# Patient Record
Sex: FEMALE | Race: WHITE | NOT HISPANIC OR LATINO | Employment: FULL TIME | URBAN - METROPOLITAN AREA
[De-identification: names, ages, dates, MRNs, and addresses within clinical notes are randomized per-mention and may not be internally consistent; named-entity substitution may affect disease eponyms.]

---

## 2022-10-06 ENCOUNTER — OCCUPATIONAL HEALTH (OUTPATIENT)
Dept: URGENT CARE | Facility: CLINIC | Age: 42
End: 2022-10-06

## 2022-10-06 DIAGNOSIS — Z00.00 ENCOUNTER FOR PHYSICAL EXAMINATION: Primary | ICD-10-CM

## 2022-10-06 PROCEDURE — 99499 UNLISTED E&M SERVICE: CPT | Mod: S$GLB,,, | Performed by: NURSE PRACTITIONER

## 2022-10-06 PROCEDURE — 99499 PHYSICAL, BASIC COMPLEXITY: ICD-10-PCS | Mod: S$GLB,,, | Performed by: NURSE PRACTITIONER

## 2023-02-01 ENCOUNTER — TELEPHONE (OUTPATIENT)
Dept: FAMILY MEDICINE | Facility: CLINIC | Age: 43
End: 2023-02-01
Payer: OTHER GOVERNMENT

## 2023-02-01 ENCOUNTER — PATIENT OUTREACH (OUTPATIENT)
Dept: ADMINISTRATIVE | Facility: HOSPITAL | Age: 43
End: 2023-02-01
Payer: OTHER GOVERNMENT

## 2023-02-01 DIAGNOSIS — Z12.31 OTHER SCREENING MAMMOGRAM: ICD-10-CM

## 2023-02-01 NOTE — TELEPHONE ENCOUNTER
----- Message from Ketty Pratt sent at 2/1/2023 10:23 AM CST -----  Regarding: Patient call back  Who called:ROBYN SAINZ [[id number]]    What is the request in detail: Patient is requesting a call back. She states she would like the staff to request her records from her [facility] Dr. Raulito Hampton ph. [phone]  Please advise.    Can the clinic reply by [facility]? No    Best call back number: [phone]    Additional Information: N/A

## 2023-02-13 ENCOUNTER — PATIENT MESSAGE (OUTPATIENT)
Dept: ADMINISTRATIVE | Facility: HOSPITAL | Age: 43
End: 2023-02-13
Payer: OTHER GOVERNMENT

## 2023-02-15 ENCOUNTER — PATIENT MESSAGE (OUTPATIENT)
Dept: FAMILY MEDICINE | Facility: CLINIC | Age: 43
End: 2023-02-15
Payer: OTHER GOVERNMENT

## 2023-02-15 ENCOUNTER — OFFICE VISIT (OUTPATIENT)
Dept: FAMILY MEDICINE | Facility: CLINIC | Age: 43
End: 2023-02-15
Payer: OTHER GOVERNMENT

## 2023-02-15 VITALS
OXYGEN SATURATION: 98 % | HEIGHT: 68 IN | BODY MASS INDEX: 27.13 KG/M2 | SYSTOLIC BLOOD PRESSURE: 118 MMHG | DIASTOLIC BLOOD PRESSURE: 75 MMHG | HEART RATE: 87 BPM | WEIGHT: 179 LBS

## 2023-02-15 DIAGNOSIS — I83.811 VARICOSE VEINS OF RIGHT LOWER EXTREMITY WITH PAIN: ICD-10-CM

## 2023-02-15 DIAGNOSIS — N92.6 IRREGULAR PERIODS: ICD-10-CM

## 2023-02-15 DIAGNOSIS — K21.00 GASTROESOPHAGEAL REFLUX DISEASE WITH ESOPHAGITIS WITHOUT HEMORRHAGE: ICD-10-CM

## 2023-02-15 DIAGNOSIS — G43.909 MIGRAINE WITHOUT STATUS MIGRAINOSUS, NOT INTRACTABLE, UNSPECIFIED MIGRAINE TYPE: ICD-10-CM

## 2023-02-15 DIAGNOSIS — Z00.00 ROUTINE HEALTH MAINTENANCE: Primary | ICD-10-CM

## 2023-02-15 DIAGNOSIS — M79.641 PAIN OF RIGHT HAND: ICD-10-CM

## 2023-02-15 DIAGNOSIS — K21.9 GASTROESOPHAGEAL REFLUX DISEASE WITHOUT ESOPHAGITIS: ICD-10-CM

## 2023-02-15 PROCEDURE — 99214 OFFICE O/P EST MOD 30 MIN: CPT | Mod: PBBFAC,PO | Performed by: FAMILY MEDICINE

## 2023-02-15 PROCEDURE — 99386 PREV VISIT NEW AGE 40-64: CPT | Mod: S$PBB,,, | Performed by: FAMILY MEDICINE

## 2023-02-15 PROCEDURE — 99999 PR PBB SHADOW E&M-EST. PATIENT-LVL IV: CPT | Mod: PBBFAC,,, | Performed by: FAMILY MEDICINE

## 2023-02-15 PROCEDURE — 99386 PR PREVENTIVE VISIT,NEW,40-64: ICD-10-PCS | Mod: S$PBB,,, | Performed by: FAMILY MEDICINE

## 2023-02-15 PROCEDURE — 99999 PR PBB SHADOW E&M-EST. PATIENT-LVL IV: ICD-10-PCS | Mod: PBBFAC,,, | Performed by: FAMILY MEDICINE

## 2023-02-15 RX ORDER — BUTALBITAL, ACETAMINOPHEN AND CAFFEINE 50; 325; 40 MG/1; MG/1; MG/1
1 TABLET ORAL EVERY 4 HOURS PRN
Qty: 90 TABLET | Refills: 0 | Status: SHIPPED | OUTPATIENT
Start: 2023-02-15

## 2023-02-15 RX ORDER — ESOMEPRAZOLE MAGNESIUM 40 MG/1
40 CAPSULE, DELAYED RELEASE ORAL
Qty: 30 CAPSULE | Refills: 11 | Status: SHIPPED | OUTPATIENT
Start: 2023-02-15 | End: 2023-02-16

## 2023-02-15 RX ORDER — ONDANSETRON 4 MG/1
TABLET, ORALLY DISINTEGRATING ORAL
COMMUNITY
Start: 2022-05-14 | End: 2023-02-15 | Stop reason: SDUPTHER

## 2023-02-15 RX ORDER — ONDANSETRON 4 MG/1
4 TABLET, ORALLY DISINTEGRATING ORAL EVERY 8 HOURS PRN
Qty: 40 TABLET | Refills: 1 | Status: SHIPPED | OUTPATIENT
Start: 2023-02-15 | End: 2023-10-23

## 2023-02-15 RX ORDER — CYCLOBENZAPRINE HCL 10 MG
TABLET ORAL
COMMUNITY
Start: 2022-07-15

## 2023-02-15 NOTE — PROGRESS NOTES
Subjective:       Patient ID: Alysa Jones is a 42 y.o. female.    Chief Complaint: Establish Care and Referral    HPI  Came in today to establish care. Requesting some different referrals to get set up since she recently moved to area.     2 months of right hand pain by 3rd MCP. Decreased  strength.       Last had EGD about 4 years ago. Has symptoms of GERD despite being on daily PPI.     Tests to Keep You Healthy    Mammogram: SCHEDULED  Cervical Cancer Screening: DUE      Social History     Social History Narrative    She does not exercise regularly.       Family History   Problem Relation Age of Onset    Hyperlipidemia Mother     Hypertension Mother     Cancer Maternal Aunt     Diabetes Maternal Grandfather     Heart disease Maternal Grandfather     Cancer Paternal Grandmother        Current Outpatient Medications:     cyclobenzaprine (FLEXERIL) 10 MG tablet, , Disp: , Rfl:     doxycycline (VIBRA-TABS) 100 MG tablet, Take 1 tablet (100 mg total) by mouth once daily., Disp: 30 tablet, Rfl: 0    ibuprofen (ADVIL,MOTRIN) 200 MG tablet, Take 200 mg by mouth every 6 (six) hours as needed.  , Disp: , Rfl:     butalbital-acetaminophen-caffeine -40 mg (FIORICET) -40 mg per tablet, Take 1 tablet by mouth every 4 (four) hours as needed for Pain., Disp: 90 tablet, Rfl: 0    ondansetron (ZOFRAN-ODT) 4 MG TbDL, Take 1 tablet (4 mg total) by mouth every 8 (eight) hours as needed., Disp: 40 tablet, Rfl: 1    pantoprazole (PROTONIX) 40 MG tablet, Take 1 tablet (40 mg total) by mouth once daily., Disp: 90 tablet, Rfl: 1    Current Facility-Administered Medications:     polidocanol 1 % (20 mg/2 mL) Soln 2 mL, 2 mL, Intravenous, Q30 Days, Markos Boone MD, 2 mL at 08/25/15 1642    Review of Systems   Constitutional:  Negative for chills and fever.   Respiratory:  Negative for cough and shortness of breath.    Cardiovascular:  Negative for chest pain.   Gastrointestinal:  Negative for abdominal  "pain.   Musculoskeletal:  Positive for arthralgias.   Skin:  Negative for rash.   Neurological:  Negative for dizziness.     Objective:   /75 (BP Location: Right arm, Patient Position: Sitting, BP Method: Small (Automatic))   Pulse 87   Ht 5' 8" (1.727 m)   Wt 81.2 kg (179 lb)   SpO2 98%   BMI 27.22 kg/m²      Physical Exam  Constitutional:       General: She is not in acute distress.     Appearance: She is well-developed. She is not diaphoretic.   HENT:      Head: Normocephalic and atraumatic.      Nose: Nose normal.   Eyes:      General:         Right eye: No discharge.         Left eye: No discharge.      Conjunctiva/sclera: Conjunctivae normal.      Pupils: Pupils are equal, round, and reactive to light.   Neck:      Thyroid: No thyromegaly.   Cardiovascular:      Rate and Rhythm: Normal rate and regular rhythm.      Heart sounds: No murmur heard.  Pulmonary:      Effort: Pulmonary effort is normal. No respiratory distress.      Breath sounds: Normal breath sounds.   Abdominal:      General: There is no distension.      Palpations: Abdomen is soft.   Skin:     Findings: No rash.   Neurological:      Mental Status: She is alert and oriented to person, place, and time.   Psychiatric:         Behavior: Behavior normal.       Assessment & Plan     Problem List Items Addressed This Visit          Neuro    Migraine    Relevant Medications    butalbital-acetaminophen-caffeine -40 mg (FIORICET) -40 mg per tablet       GI    GERD (gastroesophageal reflux disease)    Relevant Medications    ondansetron (ZOFRAN-ODT) 4 MG TbDL    Other Relevant Orders    Ambulatory referral/consult to Gastroenterology     Other Visit Diagnoses       Routine health maintenance    -  Primary    Relevant Orders    FOLLICLE STIMULATING HORMONE (Completed)    LUTEINIZING HORMONE (Completed)    Hepatitis C Antibody (Completed)    HIV 1/2 Ag/Ab (4th Gen) (Completed)    Lipid Panel (Completed)    Comprehensive Metabolic Panel " (Completed)    CBC Auto Differential (Completed)    TSH (Completed)    Hemoglobin A1C    Ambulatory referral/consult to Dermatology    Irregular periods        Relevant Orders    Ambulatory referral/consult to Obstetrics / Gynecology    Varicose veins of right lower extremity with pain        Relevant Orders    Ambulatory referral/consult to Vascular Surgery    Pain of right hand        Relevant Orders    Ambulatory referral/consult to Hand Surgery    X-Ray Hand Complete Right              Immunizations Administered on Date of Encounter - 2/15/2023       No immunizations on file.             No follow-ups on file.      Disclaimer:  This note may have been prepared using voice recognition software, it may have not been extensively proofed, as such there could be errors within the text such as sound alike errors.

## 2023-02-16 ENCOUNTER — PATIENT MESSAGE (OUTPATIENT)
Dept: DERMATOLOGY | Facility: CLINIC | Age: 43
End: 2023-02-16
Payer: OTHER GOVERNMENT

## 2023-02-16 ENCOUNTER — TELEPHONE (OUTPATIENT)
Dept: FAMILY MEDICINE | Facility: CLINIC | Age: 43
End: 2023-02-16
Payer: OTHER GOVERNMENT

## 2023-02-16 ENCOUNTER — TELEPHONE (OUTPATIENT)
Dept: GASTROENTEROLOGY | Facility: CLINIC | Age: 43
End: 2023-02-16
Payer: OTHER GOVERNMENT

## 2023-02-16 RX ORDER — PANTOPRAZOLE SODIUM 40 MG/1
40 TABLET, DELAYED RELEASE ORAL DAILY
Qty: 90 TABLET | Refills: 1 | Status: SHIPPED | OUTPATIENT
Start: 2023-02-16 | End: 2024-02-16

## 2023-02-16 NOTE — TELEPHONE ENCOUNTER
----- Message from Ketty Pratt sent at 2/16/2023  8:21 AM CST -----  Regarding: Patient call back  Who called: ROBYN SAINZ [[id number]]    What is the request in detail: Patient is requesting a call back. She states she has some dates available.   Please advise.    Can the clinic reply by [facility]? No    Best call back number: [phone]    Additional Information: N/A

## 2023-02-22 ENCOUNTER — PATIENT MESSAGE (OUTPATIENT)
Dept: FAMILY MEDICINE | Facility: CLINIC | Age: 43
End: 2023-02-22
Payer: OTHER GOVERNMENT

## 2023-02-23 ENCOUNTER — PATIENT MESSAGE (OUTPATIENT)
Dept: FAMILY MEDICINE | Facility: CLINIC | Age: 43
End: 2023-02-23
Payer: OTHER GOVERNMENT

## 2023-02-23 ENCOUNTER — HOSPITAL ENCOUNTER (OUTPATIENT)
Dept: RADIOLOGY | Facility: HOSPITAL | Age: 43
Discharge: HOME | End: 2023-02-23
Attending: FAMILY MEDICINE
Payer: OTHER GOVERNMENT

## 2023-02-23 DIAGNOSIS — M79.641 PAIN OF RIGHT HAND: ICD-10-CM

## 2023-02-23 PROCEDURE — 73130 X-RAY EXAM OF HAND: CPT | Mod: TC,RT

## 2023-02-23 PROCEDURE — 73130 XR HAND COMPLETE 3 VIEW RIGHT: ICD-10-PCS | Mod: 26,RT,, | Performed by: RADIOLOGY

## 2023-02-23 PROCEDURE — 73130 X-RAY EXAM OF HAND: CPT | Mod: 26,RT,, | Performed by: RADIOLOGY

## 2023-02-24 ENCOUNTER — PATIENT MESSAGE (OUTPATIENT)
Dept: FAMILY MEDICINE | Facility: CLINIC | Age: 43
End: 2023-02-24
Payer: OTHER GOVERNMENT

## 2023-02-28 ENCOUNTER — APPOINTMENT (OUTPATIENT)
Dept: RADIOLOGY | Facility: OTHER | Age: 43
End: 2023-02-28
Attending: FAMILY MEDICINE
Payer: OTHER GOVERNMENT

## 2023-02-28 VITALS — WEIGHT: 179 LBS | BODY MASS INDEX: 27.13 KG/M2 | HEIGHT: 68 IN

## 2023-02-28 DIAGNOSIS — Z12.31 OTHER SCREENING MAMMOGRAM: ICD-10-CM

## 2023-02-28 PROCEDURE — 77067 MAMMO DIGITAL SCREENING BILAT WITH TOMO: ICD-10-PCS | Mod: 26,,, | Performed by: RADIOLOGY

## 2023-02-28 PROCEDURE — 77063 MAMMO DIGITAL SCREENING BILAT WITH TOMO: ICD-10-PCS | Mod: 26,,, | Performed by: RADIOLOGY

## 2023-02-28 PROCEDURE — 77067 SCR MAMMO BI INCL CAD: CPT | Mod: 26,,, | Performed by: RADIOLOGY

## 2023-02-28 PROCEDURE — 77067 SCR MAMMO BI INCL CAD: CPT | Mod: TC,PN

## 2023-02-28 PROCEDURE — 77063 BREAST TOMOSYNTHESIS BI: CPT | Mod: 26,,, | Performed by: RADIOLOGY

## 2023-03-13 ENCOUNTER — PATIENT MESSAGE (OUTPATIENT)
Dept: FAMILY MEDICINE | Facility: CLINIC | Age: 43
End: 2023-03-13
Payer: OTHER GOVERNMENT

## 2023-03-13 DIAGNOSIS — D22.9 MULTIPLE ATYPICAL SKIN MOLES: Primary | ICD-10-CM

## 2023-03-15 ENCOUNTER — PATIENT MESSAGE (OUTPATIENT)
Dept: FAMILY MEDICINE | Facility: CLINIC | Age: 43
End: 2023-03-15
Payer: OTHER GOVERNMENT

## 2023-03-28 ENCOUNTER — PATIENT MESSAGE (OUTPATIENT)
Dept: FAMILY MEDICINE | Facility: CLINIC | Age: 43
End: 2023-03-28
Payer: OTHER GOVERNMENT

## 2023-04-17 ENCOUNTER — OFFICE VISIT (OUTPATIENT)
Dept: VASCULAR SURGERY | Facility: CLINIC | Age: 43
End: 2023-04-17
Payer: OTHER GOVERNMENT

## 2023-04-17 VITALS
SYSTOLIC BLOOD PRESSURE: 120 MMHG | DIASTOLIC BLOOD PRESSURE: 79 MMHG | HEART RATE: 77 BPM | BODY MASS INDEX: 27.42 KG/M2 | WEIGHT: 180.31 LBS

## 2023-04-17 DIAGNOSIS — I83.811 VARICOSE VEINS OF RIGHT LOWER EXTREMITY WITH PAIN: ICD-10-CM

## 2023-04-17 DIAGNOSIS — I83.811 VARICOSE VEINS OF RIGHT LOWER EXTREMITY WITH PAIN: Primary | ICD-10-CM

## 2023-04-17 PROCEDURE — 99999 PR PBB SHADOW E&M-EST. PATIENT-LVL III: ICD-10-PCS | Mod: PBBFAC,,, | Performed by: NURSE PRACTITIONER

## 2023-04-17 PROCEDURE — 99205 OFFICE O/P NEW HI 60 MIN: CPT | Mod: S$PBB,,, | Performed by: NURSE PRACTITIONER

## 2023-04-17 PROCEDURE — 99213 OFFICE O/P EST LOW 20 MIN: CPT | Mod: PBBFAC | Performed by: NURSE PRACTITIONER

## 2023-04-17 PROCEDURE — 99205 PR OFFICE/OUTPT VISIT, NEW, LEVL V, 60-74 MIN: ICD-10-PCS | Mod: S$PBB,,, | Performed by: NURSE PRACTITIONER

## 2023-04-17 PROCEDURE — 99999 PR PBB SHADOW E&M-EST. PATIENT-LVL III: CPT | Mod: PBBFAC,,, | Performed by: NURSE PRACTITIONER

## 2023-04-17 NOTE — PROGRESS NOTES
Lg Hendricks NP         [facility]               2023    HPI:  Alysa Jones is a 42 y.o. female with   Patient Active Problem List   Diagnosis    Tobacco use disorder    Incidental pulmonary nodule    Low back pain    History of scoliosis    GERD (gastroesophageal reflux disease)    Constipation    Migraine    Chest pain on breathing    Chest wall pain    Venous insufficiency    being managed by PCP and specialists who is here today for evaluation of  RLE edema, pain and varicose / spider veins.  Patient states location is RLE posterior knee.  Associated signs and symptoms include varicose and spider veins.  Quality is burning and bulging and severity is 5/10. Symptoms began several months ago.  Alleviating factors elevation.  Worsening factors dependency. Symptoms do not limit patient's functional status and daily activities.  Previous RLE EVLT () and multiple rounds of sclerotherapy (2376-3711). Patient subsequently developed a nonocclusive clot in her right leg post EVLT. + low sodium diet.  - excessive water intake. + compression stockings for 1 month.    No MI  No Stroke  Tobacco use: No    Past Medical History:   Diagnosis Date    Abnormal Pap smear     moderate dysplasia    Deep vein thrombosis     GERD (gastroesophageal reflux disease)     HPV (human papilloma virus) infection     Iliac DVT (deep venous thrombosis)     right, leg    Incidental pulmonary nodule     Migraine     Scoliosis     Tobacco use disorder     Varicose veins with inflammation     Venous reflux      Past Surgical History:   Procedure Laterality Date     SECTION      2    left fibula ORIF      VARICOSE VEIN SURGERY       Family History   Problem Relation Age of Onset    Hyperlipidemia Mother     Hypertension Mother     Breast cancer Maternal Aunt 40    Cancer Maternal Aunt     Diabetes Maternal Grandfather     Heart disease Maternal Grandfather     Breast cancer Paternal  Grandmother 40    Cancer Paternal Grandmother      Social History     Socioeconomic History    Marital status:      Spouse name: Onel    Number of children: 2   Occupational History    Occupation: radiology tech     Employer: [employer]   Tobacco Use    Smoking status: Former     Packs/day: 0.25     Types: Cigarettes    Smokeless tobacco: Never    Tobacco comments:     Pt started smoking again.    Substance and Sexual Activity    Alcohol use: Yes     Comment: occasional    Drug use: No    Sexual activity: Yes     Partners: Male   Social History Narrative    She does not exercise regularly.       Current Outpatient Medications:     butalbital-acetaminophen-caffeine -40 mg (FIORICET) -40 mg per tablet, Take 1 tablet by mouth every 4 (four) hours as needed for Pain., Disp: 90 tablet, Rfl: 0    cyclobenzaprine (FLEXERIL) 10 MG tablet, , Disp: , Rfl:     ibuprofen (ADVIL,MOTRIN) 200 MG tablet, Take 200 mg by mouth every 6 (six) hours as needed.  , Disp: , Rfl:     ondansetron (ZOFRAN-ODT) 4 MG TbDL, Take 1 tablet (4 mg total) by mouth every 8 (eight) hours as needed., Disp: 40 tablet, Rfl: 1    pantoprazole (PROTONIX) 40 MG tablet, Take 1 tablet (40 mg total) by mouth once daily., Disp: 90 tablet, Rfl: 1    doxycycline (VIBRA-TABS) 100 MG tablet, Take 1 tablet (100 mg total) by mouth once daily., Disp: 30 tablet, Rfl: 0    Current Facility-Administered Medications:     polidocanol 1 % (20 mg/2 mL) Soln 2 mL, 2 mL, Intravenous, Q30 Days, Markos Boone MD, 2 mL at 08/25/15 1642    REVIEW OF SYSTEMS:  General: No fevers or chills; ENT: No sore throat; Allergy and Immunology: no persistent infections; Hematological and Lymphatic: No history of bleeding or easy bruising; Endocrine: negative; Respiratory: no cough, shortness of breath, or wheezing; Cardiovascular: no chest pain or dyspnea on exertion; Gastrointestinal: no abdominal pain/back, change in bowel habits, or bloody  stools; Genito-Urinary: no dysuria, trouble voiding, or hematuria; Musculoskeletal: burning and aching pain; Neurological: no TIA or stroke symptoms; Psychiatric: no nervousness, anxiety or depression.    PHYSICAL EXAM:      Pulse: 77         General appearance:  Alert, well-appearing, and in no distress.  Oriented to person, place, and time                    Neurological: Normal speech, no focal findings noted; CN II - XII grossly intact. RLE with  sensation to light touch, LLE with  sensation to light touch.            Musculoskeletal: Digits/nail without cyanosis/clubbing.  Strength 5/5.                    Neck: Supple, no significant adenopathy                  Chest:  Clear to auscultation, no wheezes, rales or rhonchi, symmetric air entry. No use of accessory muscles               Cardiac: Normal rate and regular rhythm, S1 and S2 normal            Abdomen: Soft, nontender, nondistended, no masses or organomegaly, no hernia     No rebound tenderness noted; bowel sounds normal      Extremities:   2+ R femoral pulse, 2+ L femoral pulse     2+ R popliteal pulse, 2+ L popliteal pulse     2+ R PT pulse, 2+ L PT pulse     2+ R DP pulse, 2+ L DP pulse     1+ RLE edema,  absent LLE edema    Skin: RLE +varicose/spider veins; LLE +spider veins    LAB RESULTS:  No results found for: CBC  Lab Results   Component Value Date    LABPROT 11.1 06/29/2012    INR 1.1 06/29/2012     Lab Results   Component Value Date     02/23/2023    K 4.3 02/23/2023     02/23/2023    CO2 23 02/23/2023     02/23/2023    BUN 16 02/23/2023    CREATININE 0.8 02/23/2023    CALCIUM 9.8 02/23/2023    ANIONGAP 9 02/23/2023    EGFRNONAA >60.0 09/19/2014     Lab Results   Component Value Date    WBC 7.78 02/23/2023    RBC 4.28 02/23/2023    HGB 12.5 02/23/2023    HCT 39.4 02/23/2023    MCV 92 02/23/2023    MCH 29.2 02/23/2023    MCHC 31.7 (L) 02/23/2023    RDW 12.9 02/23/2023     02/23/2023    MPV 12.5 02/23/2023    GRAN 5.2  02/23/2023    GRAN 67.3 02/23/2023    LYMPH 1.9 02/23/2023    LYMPH 23.9 02/23/2023    MONO 0.5 02/23/2023    MONO 6.4 02/23/2023    EOS 0.1 02/23/2023    BASO 0.02 02/23/2023    EOSINOPHIL 1.8 02/23/2023    BASOPHIL 0.3 02/23/2023    DIFFMETHOD Automated 02/23/2023     .  Lab Results   Component Value Date    HGBA1C 5.1 02/23/2023       IMAGING:  All pertinent imaging has been reviewed and interpreted independently.  Venous Insuff US: PENDING    IMP/PLAN:  42 y.o. female with   Patient Active Problem List   Diagnosis    Tobacco use disorder    Incidental pulmonary nodule    Low back pain    History of scoliosis    GERD (gastroesophageal reflux disease)    Constipation    Migraine    Chest pain on breathing    Chest wall pain    Venous insufficiency    being managed by PCP and specialists who is here today for evaluation of RLE edema, pain and varicose / spider veins.    -recommend compression with Rx stockings, elevation, dietary changes associated with water and sodium intake discussed at length with patient  -Exercise   -Recommend warm compresses, NSAID and elevation for thrombophlebitis prn  -Venous insuff US  -RTC 1-2 months for further evaluation    I spent 30 minutes evaluating this patient and greater than 50% of the time was spent counseling, coordinator care and discussing the plan of care.  All questions were answered and patient stated understanding with agreement with the above treatment plan.    Lg Hendricks NP  Vascular and Endovascular Surgery

## 2023-04-28 ENCOUNTER — TELEPHONE (OUTPATIENT)
Dept: OBSTETRICS AND GYNECOLOGY | Facility: CLINIC | Age: 43
End: 2023-04-28
Payer: OTHER GOVERNMENT

## 2023-05-01 ENCOUNTER — HOSPITAL ENCOUNTER (OUTPATIENT)
Dept: CARDIOLOGY | Facility: HOSPITAL | Age: 43
Discharge: HOME | End: 2023-05-01
Attending: NURSE PRACTITIONER
Payer: OTHER GOVERNMENT

## 2023-05-01 ENCOUNTER — PATIENT MESSAGE (OUTPATIENT)
Dept: INTERNAL MEDICINE | Facility: CLINIC | Age: 43
End: 2023-05-01
Payer: OTHER GOVERNMENT

## 2023-05-01 DIAGNOSIS — I83.811 VARICOSE VEINS OF RIGHT LOWER EXTREMITY WITH PAIN: ICD-10-CM

## 2023-05-01 LAB
LEFT GREAT SAPHENOUS DISTAL THIGH DIA: 0.24 CM
LEFT GREAT SAPHENOUS JUNCTION DIA: 0.64 CM
LEFT GREAT SAPHENOUS KNEE DIA: 0.25 CM
LEFT GREAT SAPHENOUS MIDDLE THIGH DIA: 0.39 CM
LEFT GREAT SAPHENOUS PROXIMAL CALF DIA: 0.21 CM
LEFT SMALL SAPHENOUS KNEE DIA: 0.16 CM
LEFT SMALL SAPHENOUS SPJ DIA: 0.18 CM
RIGHT GREAT SAPHENOUS JUNCTION DIA: 0.44 CM
RIGHT GREAT SAPHENOUS KNEE DIA: 0.19 CM
RIGHT GREAT SAPHENOUS KNEE REFLUX: 4133 MS
RIGHT GREAT SAPHENOUS PROXIMAL CALF DIA: 0.13 CM
RIGHT SMALL SAPHENOUS KNEE DIA: 0.1 CM
RIGHT SMALL SAPHENOUS SPJ DIA: 0.17 CM

## 2023-05-01 PROCEDURE — 93970 EXTREMITY STUDY: CPT | Mod: 26,,, | Performed by: INTERNAL MEDICINE

## 2023-05-01 PROCEDURE — 93970 EXTREMITY STUDY: CPT | Mod: TC

## 2023-05-01 PROCEDURE — 93970 CV US LOWER VENOUS INSUFFICIENCY BILATERAL (CUPID ONLY): ICD-10-PCS | Mod: 26,,, | Performed by: INTERNAL MEDICINE

## 2023-05-06 ENCOUNTER — TELEPHONE (OUTPATIENT)
Dept: ORTHOPEDICS | Facility: CLINIC | Age: 43
End: 2023-05-06
Payer: OTHER GOVERNMENT

## 2023-06-01 ENCOUNTER — PATIENT MESSAGE (OUTPATIENT)
Dept: VASCULAR SURGERY | Facility: CLINIC | Age: 43
End: 2023-06-01
Payer: OTHER GOVERNMENT

## 2023-06-12 ENCOUNTER — OFFICE VISIT (OUTPATIENT)
Dept: DERMATOLOGY | Facility: CLINIC | Age: 43
End: 2023-06-12
Payer: OTHER GOVERNMENT

## 2023-06-12 DIAGNOSIS — L70.0 ACNE VULGARIS: ICD-10-CM

## 2023-06-12 DIAGNOSIS — D22.9 NEVUS: ICD-10-CM

## 2023-06-12 DIAGNOSIS — D23.30 FIBROUS PAPULE OF FACE: ICD-10-CM

## 2023-06-12 DIAGNOSIS — L82.1 SK (SEBORRHEIC KERATOSIS): ICD-10-CM

## 2023-06-12 DIAGNOSIS — L71.9 ROSACEA: Primary | ICD-10-CM

## 2023-06-12 DIAGNOSIS — D18.01 CHERRY ANGIOMA: ICD-10-CM

## 2023-06-12 PROCEDURE — 99204 OFFICE O/P NEW MOD 45 MIN: CPT | Mod: S$PBB,,, | Performed by: DERMATOLOGY

## 2023-06-12 PROCEDURE — 99204 PR OFFICE/OUTPT VISIT, NEW, LEVL IV, 45-59 MIN: ICD-10-PCS | Mod: S$PBB,,, | Performed by: DERMATOLOGY

## 2023-06-12 PROCEDURE — 99999 PR PBB SHADOW E&M-EST. PATIENT-LVL IV: CPT | Mod: PBBFAC,,, | Performed by: DERMATOLOGY

## 2023-06-12 PROCEDURE — 99999 PR PBB SHADOW E&M-EST. PATIENT-LVL IV: ICD-10-PCS | Mod: PBBFAC,,, | Performed by: DERMATOLOGY

## 2023-06-12 PROCEDURE — 99214 OFFICE O/P EST MOD 30 MIN: CPT | Mod: PBBFAC,PO | Performed by: DERMATOLOGY

## 2023-06-12 RX ORDER — DOXYCYCLINE HYCLATE 100 MG
TABLET ORAL
Qty: 30 TABLET | Refills: 1 | Status: SHIPPED | OUTPATIENT
Start: 2023-06-12

## 2023-06-12 RX ORDER — SODIUM SULFACETAMIDE AND SULFUR 80; 40 MG/ML; MG/ML
SOLUTION TOPICAL
Qty: 473 ML | Refills: 3 | Status: SHIPPED | OUTPATIENT
Start: 2023-06-12

## 2023-06-12 NOTE — PROGRESS NOTES
"  Subjective:      Patient ID:  Alysa Jones is a 42 y.o. female who presents for   Chief Complaint   Patient presents with    Skin Check     ubse    Lesion     Face     Mole     Back      Pt here today for UBSE    Patient with new complaint of lesion(s)  Location: Lip   Duration: >1yr  Symptoms: "flares" "pimple" thought it was fever blister or impetigo  Relieving factors/Previous treatments: none    Patient with new complaint of lesion(s)  Location: back  Duration: >1yr  Symptoms: growing, itching  Relieving factors/Previous treatments: none    Pt also c/o dark spots to face             Lesion    Mole    Review of Systems   Skin:  Positive for daily sunscreen use and activity-related sunscreen use.   Hematologic/Lymphatic: Bruises/bleeds easily (bruise).     Objective:   Physical Exam   Constitutional: She appears well-developed and well-nourished. No distress.   Neurological: She is alert and oriented to person, place, and time. She is not disoriented.   Psychiatric: She has a normal mood and affect.   Skin:   Areas Examined (abnormalities noted in diagram):   Scalp / Hair Palpated and Inspected  Head / Face Inspection Performed  Neck Inspection Performed  Chest / Axilla Inspection Performed  Abdomen Inspection Performed  Back Inspection Performed  RUE Inspected  LUE Inspection Performed  Nails and Digits Inspection Performed               Diagram Legend     Erythematous scaling macule/papule c/w actinic keratosis       Vascular papule c/w angioma      Pigmented verrucoid papule/plaque c/w seborrheic keratosis      Yellow umbilicated papule c/w sebaceous hyperplasia      Irregularly shaped tan macule c/w lentigo     1-2 mm smooth white papules consistent with Milia      Movable subcutaneous cyst with punctum c/w epidermal inclusion cyst      Subcutaneous movable cyst c/w pilar cyst      Firm pink to brown papule c/w dermatofibroma      Pedunculated fleshy papule(s) c/w skin tag(s)      Evenly pigmented " macule c/w junctional nevus     Mildly variegated pigmented, slightly irregular-bordered macule c/w mildly atypical nevus      Flesh colored to evenly pigmented papule c/w intradermal nevus       Pink pearly papule/plaque c/w basal cell carcinoma      Erythematous hyperkeratotic cursted plaque c/w SCC      Surgical scar with no sign of skin cancer recurrence      Open and closed comedones      Inflammatory papules and pustules      Verrucoid papule consistent consistent with wart     Erythematous eczematous patches and plaques     Dystrophic onycholytic nail with subungual debris c/w onychomycosis     Umbilicated papule    Erythematous-base heme-crusted tan verrucoid plaque consistent with inflamed seborrheic keratosis     Erythematous Silvery Scaling Plaque c/w Psoriasis     See annotation      Assessment / Plan:        Rosacea  -     sulfacetamide sodium-sulfur (SULFACLEANSE 8-4) 8-4 % Susp; Wash face qhs  Dispense: 473 mL; Refill: 3  -     doxycycline (VIBRA-TABS) 100 MG tablet; Sig 1 po qd with food and not within 1 hour of bedtime  Dispense: 30 tablet; Refill: 1    Acne vulgaris  -     Ambulatory referral/consult to Dermatology  -     sulfacetamide sodium-sulfur (SULFACLEANSE 8-4) 8-4 % Susp; Wash face qhs  Dispense: 473 mL; Refill: 3  -     doxycycline (VIBRA-TABS) 100 MG tablet; Sig 1 po qd with food and not within 1 hour of bedtime  Dispense: 30 tablet; Refill: 1    PM:  Wash with sulfacleanser  Thin film of compound clindamycin /tretinoin/azaleic acid/niacinamide all over every other to every night   Moisturize with cerave pm or vanicream daily moisturizer to minimize irritation    AM:  Wash with mild cleanser  2. Moisturizer with spf 30+    A tint is recommended too- such as makeup, tinted sunscreen, BB/CC creams. The iron oxide in the tint of products protects against agents other than UV light that damage our skin such as blue light from screens, infrared heat, visible light, and other other  environmental pollutants.  These other factors can contribute significantly to irregular pigment, especially in skin of color and tint helps protect from these factors.     Discussed benefits and risks of doxycyline therapy including but not limited to GI discomfort, esophageal irritation/ulceration, and increased sun sensitivity. Patient was counseled to take medicine with meals and at least 1 hour before lying down.  Take for at least 2 weeks     SK (seborrheic keratosis)- pt would like removal as lesions get irritated but going out of town so will defer until later  -     Ambulatory referral/consult to Dermatology  These are benign inherited growths without a malignant potential. Reassurance given to patient. No treatment is necessary.     Nevus  Discussed ABCDE's of nevi.  Monitor for new mole or moles that are becoming bigger, darker, irritated, or developing irregular borders. Brochure provided. Instructed patient to observe lesion(s) for changes and follow up in clinic if changes are noted. Patient to monitor skin at home for new or changing lesions.     Cherry angioma  These are benign vascular lesions that are inherited.  Treatment is not necessary.    Fibrous papule of face  Reassurance given to patient. No treatment is necessary.   Treatment of benign, asymptomatic lesions may be considered cosmetic.  Area(s) of previous NMSC evaluated with no signs of recurrence.    Upper body skin examination performed today including at least 6 points as noted in physical examination. No lesions suspicious for malignancy noted.    Recommend daily sun protection/avoidance and use of at least SPF 30, broad spectrum sunscreen (OTC drug).              Follow up in about 3 months (around 9/12/2023) for sk removal on back .

## 2023-06-12 NOTE — PATIENT INSTRUCTIONS
PM:  Wash with sulfacleanser  Thin film of compound clindamycin /tretinoin/azaleic acid/niacinamide all over every other to every night   Moisturize with cerave pm or vanicream daily moisturizer to minimize irritation    AM:  Wash with mild cleanser  2. Moisturizer with spf 30+    A tint is recommended too- such as makeup, tinted sunscreen, BB/CC creams. The iron oxide in the tint of products protects against agents other than UV light that damage our skin such as blue light from screens, infrared heat, visible light, and other other environmental pollutants.  These other factors can contribute significantly to irregular pigment, especially in skin of color and tint helps protect from these factors.     Discussed benefits and risks of doxycyline therapy including but not limited to GI discomfort, esophageal irritation/ulceration, and increased sun sensitivity. Patient was counseled to take medicine with meals and at least 1 hour before lying down.  Take for at least 2 weeks

## 2023-07-07 ENCOUNTER — OFFICE VISIT (OUTPATIENT)
Dept: OBSTETRICS AND GYNECOLOGY | Facility: CLINIC | Age: 43
End: 2023-07-07
Payer: OTHER GOVERNMENT

## 2023-07-07 VITALS
DIASTOLIC BLOOD PRESSURE: 80 MMHG | SYSTOLIC BLOOD PRESSURE: 111 MMHG | RESPIRATION RATE: 16 BRPM | HEIGHT: 68 IN | BODY MASS INDEX: 26.76 KG/M2 | WEIGHT: 176.56 LBS

## 2023-07-07 DIAGNOSIS — Z12.4 SCREENING FOR CERVICAL CANCER: ICD-10-CM

## 2023-07-07 DIAGNOSIS — Z11.51 SCREENING FOR HPV (HUMAN PAPILLOMAVIRUS): ICD-10-CM

## 2023-07-07 DIAGNOSIS — N93.9 ABNORMAL UTERINE BLEEDING: ICD-10-CM

## 2023-07-07 PROCEDURE — 88175 CYTOPATH C/V AUTO FLUID REDO: CPT

## 2023-07-07 PROCEDURE — 99213 OFFICE O/P EST LOW 20 MIN: CPT | Mod: S$PBB,,,

## 2023-07-07 PROCEDURE — 99999 PR PBB SHADOW E&M-EST. PATIENT-LVL III: CPT | Mod: PBBFAC,,,

## 2023-07-07 PROCEDURE — 99213 PR OFFICE/OUTPT VISIT, EST, LEVL III, 20-29 MIN: ICD-10-PCS | Mod: S$PBB,,,

## 2023-07-07 PROCEDURE — 99213 OFFICE O/P EST LOW 20 MIN: CPT | Mod: PBBFAC

## 2023-07-07 PROCEDURE — 99999 PR PBB SHADOW E&M-EST. PATIENT-LVL III: ICD-10-PCS | Mod: PBBFAC,,,

## 2023-07-07 PROCEDURE — 87624 HPV HI-RISK TYP POOLED RSLT: CPT

## 2023-07-07 RX ORDER — ESOMEPRAZOLE MAGNESIUM 40 MG/1
40 CAPSULE, DELAYED RELEASE ORAL DAILY
COMMUNITY
Start: 2023-02-17

## 2023-07-07 RX ORDER — HYDROCORTISONE 25 MG/G
CREAM TOPICAL 2 TIMES DAILY PRN
COMMUNITY
Start: 2023-03-15

## 2023-07-07 NOTE — PROGRESS NOTES
OBSTETRICS AND GYNECOLOGY    Subjective:      Chief Complaint:  AUB-O    HPI:  Alysa Jones is an 42 y.o.  presenting with concerns of heavy and irregular bleeding. Has been happening for several years. Large, golf ball sized clots. 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 Has to wear a pad daily. She has also had a polypectomy in the past. Did not affect bleeding pattern.     Has been tracking menses. Sometimes will have two menses in a month. Sometimes has moliminal symptoms without bleeding. Second day of period is the worst, constitutional symptoms that makes her almost feel sick and like she needs to call in to work. Wearing a pad daily because of the unpredictable bleeding. Also causes skin sensitivity due to the constant pad wearing. Reports interested in hysterectomy but has not been counseled regarding all options.     Has tried ibuprofen in the past. Tried OCPs in the past, still had BTB. Was taken off of OCPs due to her migraines with aura (fioricet for this) and a blood clot. Post-procedure of greater saphenous vein ablated, had a blood clot in iliac junction; was on xarelto.  Mom (had 4 PEs at one time - [facility]) and sister (post travel) have also both had a PE. Unsure if bleeding disorder evaluation in either.     Reports a history of a complex cyst, about 2 years ago. Never diagnosed with endometriosis. Sister has had a hyst, prior to required pRBC transfusions. SO with vasectomy. Patient endorses satisfied parity.     History of MRSA, hospitalization x 34days after first delivery.  Term CS x 2.  Had visit with NP, note not visible today.    Review of systems:  Denies abnormal vaginal discharge, or dysuria.     OB History    Para Term  AB Living   2 2 2     2   SAB IAB Ectopic Multiple Live Births           2      # Outcome Date GA Lbr Alfie/2nd Weight Sex Delivery Anes PTL Lv   2 Term      CS-Unspec   ROB   1  Term      CS-Unspec   ROB     Past Medical History:   Diagnosis Date    Abnormal Pap smear     moderate dysplasia    Deep vein thrombosis     GERD (gastroesophageal reflux disease)     HPV (human papilloma virus) infection     Iliac DVT (deep venous thrombosis)     right, leg    Incidental pulmonary nodule     Migraine     Scoliosis     Tobacco use disorder     Varicose veins with inflammation     Venous reflux      Past Surgical History:   Procedure Laterality Date     SECTION      2    left fibula ORIF      VARICOSE VEIN SURGERY       Family History   Problem Relation Age of Onset    Hyperlipidemia Mother     Hypertension Mother     Breast cancer Maternal Aunt 40    Cancer Maternal Aunt     Diabetes Maternal Grandfather     Heart disease Maternal Grandfather     Breast cancer Paternal Grandmother 40    Cancer Paternal Grandmother        Allergies:   Review of patient's allergies indicates:   Allergen Reactions    Adhesive Rash     Plastic tape    Hydrocodone Nausea And Vomiting       Medications:   Current Outpatient Medications   Medication Sig Dispense Refill    butalbital-acetaminophen-caffeine -40 mg (FIORICET) -40 mg per tablet Take 1 tablet by mouth every 4 (four) hours as needed for Pain. 90 tablet 0    cyclobenzaprine (FLEXERIL) 10 MG tablet       doxycycline (VIBRA-TABS) 100 MG tablet Sig 1 po qd with food and not within 1 hour of bedtime 30 tablet 1    esomeprazole (NEXIUM) 40 MG capsule Take 40 mg by mouth once daily.      hydrocortisone 2.5 % cream Apply topically 2 (two) times daily as needed.      ibuprofen (ADVIL,MOTRIN) 200 MG tablet Take 200 mg by mouth every 6 (six) hours as needed.        ondansetron (ZOFRAN-ODT) 4 MG TbDL Take 1 tablet (4 mg total) by mouth every 8 (eight) hours as needed. 40 tablet 1    pantoprazole (PROTONIX) 40 MG tablet Take 1 tablet (40 mg total) by mouth once daily. 90 tablet 1    sulfacetamide sodium-sulfur (SULFACLEANSE 8-4) 8-4 % Susp Wash face  "qhs 473 mL 3    doxycycline (VIBRA-TABS) 100 MG tablet Take 1 tablet (100 mg total) by mouth once daily. 30 tablet 0     No current facility-administered medications for this visit.       Social History     Tobacco Use    Smoking status: Former     Packs/day: 0.25     Types: Cigarettes    Smokeless tobacco: Never    Tobacco comments:     Pt started smoking again.    Substance Use Topics    Alcohol use: Yes     Comment: occasional       Objective:   /76   Ht 5' 8" (1.727 m)   Wt 79.4 kg (175 lb 0.7 oz)   LMP 06/29/2023 (Approximate)   BMI 26.62 kg/m²   Physical Exam    GENERAL: Alert, well dressed, well nourished. Appropriate mood and affect. Pleasant.  HEENT: Normocephalic, atraumatic. Extraocular movements intact. Hearing and vision grossly intact.   PULMONARY: No respiratory distress. No use of accessory muscles of respiration. No cyanosis. Speaking comfortably in full sentences.   CARDIAC: Well perfused.    EXTREMITIES: No edema. No visible rashes.   NEURO: Gait witnessed and WNL.    Assessment/Plan:     AUB-O  - UPT negative  - Labs:  A1c 5.1%, TSH WNL 2023  - Pap smear 7/2023:  pending, HPV(-)  - EMB:  schedule today  -  7/2023:  pending formal report, seems consistent with hemorrhagic cyst on imaging reviewed today  History of complex cyst per patient  - Rec thrombophilia evaluation - orders placed  Personal + family history of VTE  Consider heme consult pending results  - Record request pending - reports had entire work-up with prior OB/GYN in [place] in 2021  - Follow up for EMB / results / counseling on options           As of April 1, 2021, the Cures Act has been passed nationally. This new law requires that all doctors progress notes, lab results, pathology reports and radiology reports be released IMMEDIATELY to the patient in the patient portal. That means that the results are released to you at the EXACT same time they are released to me. Therefore, with all of the patients that I have I am " not able to reply to each patient exactly when the results come in. So there will be a delay from when you see the results to when I see them and have time to come up with a response to send you. Also I only see these results when I am on the computer at work. So if the results come in over the weekend or after 5 pm of a work day, I will not see them until the next business day. As you can tell, this is a challenge as a physician to give every patient the quick response they hope for and deserve. So please be patient!   Thanks for your understanding and patience.

## 2023-07-07 NOTE — PROGRESS NOTES
"Chief Complaint: AUB     HPI:      Alysa Jones is a 42 y.o.  who presents today for abnormal uterine bleeding.   Reports heavy and irregular bleeding for the last several years.  Patient's last menstrual period was 2023 (approximate).  Menses come at different times of the month and sometimes has two in one month.  Endorses horrible cramping and passing large clots during periods. Sometimes has to call into work for cramping/pain and nausea. During periods she uses super plus tampons and pads and is changing them every hour or so.  Has a hx of fibroids, polpys s/p polypectomy, and complex right ovarian cyst.  Had undergone workup for AUB and hysterectomy with previous OBGYN and then moved here last .  Reports EMB negative.  Never diagnosed with endometriosis. Has been on OCP's in the past but discontinued them due to blood clot - no hx of blood clotting disorder.  She is very interested in a hysterectomy but would like to be counseled on her options.    Physical Exam:      PHYSICAL EXAM:  /80 (BP Location: Right arm, Patient Position: Sitting)   Resp 16   Ht 5' 8" (1.727 m)   Wt 80.1 kg (176 lb 9.4 oz)   LMP 2023 (Approximate)   BMI 26.85 kg/m²   Body mass index is 26.85 kg/m².     APPEARANCE: Well nourished, well developed, in no acute distress.  PELVIC:  External genitalia and urethra within normal limits. Vagina without lesions, without discharge, without erythema, without ulcers.  Cervix without cervical motion tenderness, non-friable. Uterus: normal size, mobile, non-tender.  No adnexal masses or tenderness palpated.    Assessment/Plan:     Abnormal uterine bleeding  -     US Pelvis Comp with Transvag NON-OB (xpd; Future; Expected date: 2023    Screening for HPV (human papillomavirus)  -     HPV High Risk Genotypes, PCR    Screening for cervical cancer  -     Liquid-Based Pap Smear, Screening    Follow up for TVUS and visit with Dr. Murray for further " counseling and management options.    Stacey Rogers (Maggie), CRISTOPHER  Obstetrics and Gynecology  [facility]

## 2023-07-11 LAB
HPV HR 12 DNA SPEC QL NAA+PROBE: NEGATIVE
HPV16 AG SPEC QL: NEGATIVE
HPV18 DNA SPEC QL NAA+PROBE: NEGATIVE

## 2023-07-12 ENCOUNTER — OFFICE VISIT (OUTPATIENT)
Dept: OBSTETRICS AND GYNECOLOGY | Facility: CLINIC | Age: 43
End: 2023-07-12
Payer: OTHER GOVERNMENT

## 2023-07-12 ENCOUNTER — LAB VISIT (OUTPATIENT)
Dept: LAB | Facility: HOSPITAL | Age: 43
End: 2023-07-12
Attending: STUDENT IN AN ORGANIZED HEALTH CARE EDUCATION/TRAINING PROGRAM
Payer: OTHER GOVERNMENT

## 2023-07-12 ENCOUNTER — PATIENT MESSAGE (OUTPATIENT)
Dept: OBSTETRICS AND GYNECOLOGY | Facility: CLINIC | Age: 43
End: 2023-07-12

## 2023-07-12 VITALS
DIASTOLIC BLOOD PRESSURE: 76 MMHG | WEIGHT: 175.06 LBS | SYSTOLIC BLOOD PRESSURE: 110 MMHG | BODY MASS INDEX: 26.53 KG/M2 | HEIGHT: 68 IN

## 2023-07-12 DIAGNOSIS — N93.9 ABNORMAL UTERINE BLEEDING: Primary | ICD-10-CM

## 2023-07-12 DIAGNOSIS — N93.9 ABNORMAL UTERINE BLEEDING: ICD-10-CM

## 2023-07-12 LAB
B-HCG UR QL: NEGATIVE
BASOPHILS # BLD AUTO: 0.02 K/UL (ref 0–0.2)
BASOPHILS NFR BLD: 0.3 % (ref 0–1.9)
CTP QC/QA: YES
DIFFERENTIAL METHOD: ABNORMAL
EOSINOPHIL # BLD AUTO: 0.1 K/UL (ref 0–0.5)
EOSINOPHIL NFR BLD: 1.6 % (ref 0–8)
ERYTHROCYTE [DISTWIDTH] IN BLOOD BY AUTOMATED COUNT: 13.8 % (ref 11.5–14.5)
FINAL PATHOLOGIC DIAGNOSIS: NORMAL
HCT VFR BLD AUTO: 41.5 % (ref 37–48.5)
HGB BLD-MCNC: 13.2 G/DL (ref 12–16)
IMM GRANULOCYTES # BLD AUTO: 0.02 K/UL (ref 0–0.04)
IMM GRANULOCYTES NFR BLD AUTO: 0.3 % (ref 0–0.5)
LYMPHOCYTES # BLD AUTO: 1.3 K/UL (ref 1–4.8)
LYMPHOCYTES NFR BLD: 17.3 % (ref 18–48)
Lab: NORMAL
MCH RBC QN AUTO: 29.7 PG (ref 27–31)
MCHC RBC AUTO-ENTMCNC: 31.8 G/DL (ref 32–36)
MCV RBC AUTO: 93 FL (ref 82–98)
MONOCYTES # BLD AUTO: 0.7 K/UL (ref 0.3–1)
MONOCYTES NFR BLD: 9.7 % (ref 4–15)
NEUTROPHILS # BLD AUTO: 5.3 K/UL (ref 1.8–7.7)
NEUTROPHILS NFR BLD: 70.8 % (ref 38–73)
NRBC BLD-RTO: 0 /100 WBC
PLATELET # BLD AUTO: 191 K/UL (ref 150–450)
PMV BLD AUTO: 12.2 FL (ref 9.2–12.9)
RBC # BLD AUTO: 4.45 M/UL (ref 4–5.4)
WBC # BLD AUTO: 7.53 K/UL (ref 3.9–12.7)

## 2023-07-12 PROCEDURE — 81241 F5 GENE: CPT | Performed by: STUDENT IN AN ORGANIZED HEALTH CARE EDUCATION/TRAINING PROGRAM

## 2023-07-12 PROCEDURE — 85300 ANTITHROMBIN III ACTIVITY: CPT | Performed by: STUDENT IN AN ORGANIZED HEALTH CARE EDUCATION/TRAINING PROGRAM

## 2023-07-12 PROCEDURE — 36415 COLL VENOUS BLD VENIPUNCTURE: CPT | Performed by: STUDENT IN AN ORGANIZED HEALTH CARE EDUCATION/TRAINING PROGRAM

## 2023-07-12 PROCEDURE — 86147 CARDIOLIPIN ANTIBODY EA IG: CPT | Performed by: STUDENT IN AN ORGANIZED HEALTH CARE EDUCATION/TRAINING PROGRAM

## 2023-07-12 PROCEDURE — 99999 PR PBB SHADOW E&M-EST. PATIENT-LVL III: CPT | Mod: PBBFAC,,, | Performed by: STUDENT IN AN ORGANIZED HEALTH CARE EDUCATION/TRAINING PROGRAM

## 2023-07-12 PROCEDURE — 99999 PR PBB SHADOW E&M-EST. PATIENT-LVL III: ICD-10-PCS | Mod: PBBFAC,,, | Performed by: STUDENT IN AN ORGANIZED HEALTH CARE EDUCATION/TRAINING PROGRAM

## 2023-07-12 PROCEDURE — 85610 PROTHROMBIN TIME: CPT | Performed by: STUDENT IN AN ORGANIZED HEALTH CARE EDUCATION/TRAINING PROGRAM

## 2023-07-12 PROCEDURE — 99213 PR OFFICE/OUTPT VISIT, EST, LEVL III, 20-29 MIN: ICD-10-PCS | Mod: S$PBB,,, | Performed by: STUDENT IN AN ORGANIZED HEALTH CARE EDUCATION/TRAINING PROGRAM

## 2023-07-12 PROCEDURE — 99213 OFFICE O/P EST LOW 20 MIN: CPT | Mod: S$PBB,,, | Performed by: STUDENT IN AN ORGANIZED HEALTH CARE EDUCATION/TRAINING PROGRAM

## 2023-07-12 PROCEDURE — 85025 COMPLETE CBC W/AUTO DIFF WBC: CPT | Performed by: STUDENT IN AN ORGANIZED HEALTH CARE EDUCATION/TRAINING PROGRAM

## 2023-07-12 PROCEDURE — 86146 BETA-2 GLYCOPROTEIN ANTIBODY: CPT | Performed by: STUDENT IN AN ORGANIZED HEALTH CARE EDUCATION/TRAINING PROGRAM

## 2023-07-12 PROCEDURE — 99213 OFFICE O/P EST LOW 20 MIN: CPT | Mod: PBBFAC | Performed by: STUDENT IN AN ORGANIZED HEALTH CARE EDUCATION/TRAINING PROGRAM

## 2023-07-12 PROCEDURE — 85220 BLOOC CLOT FACTOR V TEST: CPT | Performed by: STUDENT IN AN ORGANIZED HEALTH CARE EDUCATION/TRAINING PROGRAM

## 2023-07-12 PROCEDURE — 85613 RUSSELL VIPER VENOM DILUTED: CPT | Performed by: STUDENT IN AN ORGANIZED HEALTH CARE EDUCATION/TRAINING PROGRAM

## 2023-07-12 PROCEDURE — 81025 URINE PREGNANCY TEST: CPT | Mod: PBBFAC | Performed by: STUDENT IN AN ORGANIZED HEALTH CARE EDUCATION/TRAINING PROGRAM

## 2023-07-12 PROCEDURE — 81240 F2 GENE: CPT | Performed by: STUDENT IN AN ORGANIZED HEALTH CARE EDUCATION/TRAINING PROGRAM

## 2023-07-12 RX ORDER — ACETAMINOPHEN AND CODEINE PHOSPHATE 120; 12 MG/5ML; MG/5ML
1 SOLUTION ORAL DAILY
Qty: 30 TABLET | Refills: 3 | Status: SHIPPED | OUTPATIENT
Start: 2023-07-12 | End: 2023-09-25 | Stop reason: SDUPTHER

## 2023-07-13 ENCOUNTER — PATIENT MESSAGE (OUTPATIENT)
Dept: DERMATOLOGY | Facility: CLINIC | Age: 43
End: 2023-07-13
Payer: OTHER GOVERNMENT

## 2023-07-13 LAB
AT III ACT/NOR PPP CHRO: 131 % (ref 83–118)
FACT V ACT/NOR PPP: 154 % (ref 60–145)

## 2023-07-14 ENCOUNTER — LAB VISIT (OUTPATIENT)
Dept: LAB | Facility: HOSPITAL | Age: 43
End: 2023-07-14
Attending: STUDENT IN AN ORGANIZED HEALTH CARE EDUCATION/TRAINING PROGRAM
Payer: OTHER GOVERNMENT

## 2023-07-14 DIAGNOSIS — N93.9 ABNORMAL UTERINE BLEEDING: ICD-10-CM

## 2023-07-14 LAB
APTT PPP: 26.1 SEC (ref 21–32)
INR PPP: 1 (ref 0.8–1.2)
PROTHROMBIN TIME: 10.7 SEC (ref 9–12.5)

## 2023-07-14 PROCEDURE — 85610 PROTHROMBIN TIME: CPT | Performed by: STUDENT IN AN ORGANIZED HEALTH CARE EDUCATION/TRAINING PROGRAM

## 2023-07-14 PROCEDURE — 85730 THROMBOPLASTIN TIME PARTIAL: CPT | Performed by: STUDENT IN AN ORGANIZED HEALTH CARE EDUCATION/TRAINING PROGRAM

## 2023-07-14 PROCEDURE — 36415 COLL VENOUS BLD VENIPUNCTURE: CPT | Performed by: STUDENT IN AN ORGANIZED HEALTH CARE EDUCATION/TRAINING PROGRAM

## 2023-07-16 LAB
APTT IMM NP PPP: NORMAL SEC (ref 32–48)
APTT P HEP NEUT PPP: NORMAL SEC (ref 32–48)
CONFIRM APTT STACLOT: NORMAL
DRVVT SCREEN TO CONFIRM RATIO: NORMAL RATIO
LA 3 SCREEN W REFLEX-IMP: NORMAL
LA NT DPL PPP QL: NORMAL
MIXING DRVVT: NORMAL SEC (ref 33–44)
PROTHROMBIN TIME: 12.9 SEC (ref 12–15.5)
REPTILASE TIME: NORMAL SEC
SCREEN APTT: 38 SEC (ref 32–48)
SCREEN DRVVT: 41 SEC (ref 33–44)
THROMBIN TIME: NORMAL SEC (ref 14.7–19.5)

## 2023-07-17 ENCOUNTER — TELEPHONE (OUTPATIENT)
Dept: OBSTETRICS AND GYNECOLOGY | Facility: CLINIC | Age: 43
End: 2023-07-17
Payer: OTHER GOVERNMENT

## 2023-07-17 DIAGNOSIS — I82.90 VTE (VENOUS THROMBOEMBOLISM): Primary | ICD-10-CM

## 2023-07-17 LAB
B2 GLYCOPROT1 IGA SER QL: 1.6 U/ML
B2 GLYCOPROT1 IGG SER QL: <0.8 U/ML
B2 GLYCOPROT1 IGM SER QL: 2.5 U/ML
CARDIOLIPIN IGG SER IA-ACNC: <9.4 GPL (ref 0–14.99)
CARDIOLIPIN IGM SER IA-ACNC: <9.4 MPL (ref 0–12.49)
F2 C.20210G>A GENO BLD/T: NEGATIVE
F5 P.R506Q BLD/T QL: NEGATIVE

## 2023-07-17 NOTE — TELEPHONE ENCOUNTER
Called and spoke to patient.  Patient stated that you had given her a call on earlier this afternoon regarding lab results. Patient mentioned that you were also going to set her up with a Hematology consultation as well. She says that yes, most of her labs had come back normal but she says some were abnormal also. She was returning your phone call.

## 2023-07-17 NOTE — TELEPHONE ENCOUNTER
----- Message from Mike Ramiro Dalal sent at 7/17/2023  3:36 PM CDT -----  Regarding: Alysa  Type:  Patient Returning Call     Who Called: Alysa     Who Left Message for Patient: Dr. Murray     Does the patient know what this is regarding?:No     Would the patient rather a call back or a response via [facility]? Callback     Best Call Back Number:.[phone]       Additional Information:

## 2023-07-20 ENCOUNTER — TELEPHONE (OUTPATIENT)
Dept: OBSTETRICS AND GYNECOLOGY | Facility: CLINIC | Age: 43
End: 2023-07-20
Payer: OTHER GOVERNMENT

## 2023-07-20 NOTE — TELEPHONE ENCOUNTER
Records received from [facility], [address], [address], [place] [address] ([phone]).    2/2021 NILM, HPV(-)    3/2021 Pelvic US:  UT 9x4cm, EMS 0.8cm with echogenic component of 0.7cm may represent endometrial polyp vs submucous fibroid, ROV 1.6x2.6cm with complex septated 1.3cm cyst, LOV 2x2cm, fibroid 1cm mid post    3/2021 Had office EMB and hysteroscopy, small fibroids seen  Proliferative endometrium with focal features suggestive of a polyp. No malignancy identified.   Benign endocervical polyp, no intraepithelial lesion or malignancy identified.     OB/GYN Hx:  CS x2, menarche age 12, SO with vasectomy, history of HPV

## 2023-07-31 ENCOUNTER — PATIENT MESSAGE (OUTPATIENT)
Dept: DERMATOLOGY | Facility: CLINIC | Age: 43
End: 2023-07-31
Payer: OTHER GOVERNMENT

## 2023-07-31 ENCOUNTER — PATIENT MESSAGE (OUTPATIENT)
Dept: OBSTETRICS AND GYNECOLOGY | Facility: CLINIC | Age: 43
End: 2023-07-31
Payer: OTHER GOVERNMENT

## 2023-08-01 ENCOUNTER — PATIENT MESSAGE (OUTPATIENT)
Dept: OBSTETRICS AND GYNECOLOGY | Facility: CLINIC | Age: 43
End: 2023-08-01
Payer: OTHER GOVERNMENT

## 2023-08-02 ENCOUNTER — PATIENT MESSAGE (OUTPATIENT)
Dept: OBSTETRICS AND GYNECOLOGY | Facility: CLINIC | Age: 43
End: 2023-08-02
Payer: OTHER GOVERNMENT

## 2023-08-02 RX ORDER — DIAZEPAM 2 MG/1
2 TABLET ORAL
Qty: 2 TABLET | Refills: 0 | Status: SHIPPED | OUTPATIENT
Start: 2023-08-02 | End: 2023-08-03

## 2023-08-07 ENCOUNTER — PATIENT MESSAGE (OUTPATIENT)
Dept: DERMATOLOGY | Facility: CLINIC | Age: 43
End: 2023-08-07
Payer: OTHER GOVERNMENT

## 2023-08-07 DIAGNOSIS — B00.9 HERPES SIMPLEX: Primary | ICD-10-CM

## 2023-08-09 RX ORDER — VALACYCLOVIR HYDROCHLORIDE 1 G/1
2 TABLET, FILM COATED ORAL EVERY 12 HOURS
Qty: 12 TABLET | Refills: 5 | Status: SHIPPED | OUTPATIENT
Start: 2023-08-09

## 2023-08-15 ENCOUNTER — PATIENT MESSAGE (OUTPATIENT)
Dept: OBSTETRICS AND GYNECOLOGY | Facility: CLINIC | Age: 43
End: 2023-08-15
Payer: OTHER GOVERNMENT

## 2023-08-16 ENCOUNTER — TELEPHONE (OUTPATIENT)
Dept: OBSTETRICS AND GYNECOLOGY | Facility: CLINIC | Age: 43
End: 2023-08-16

## 2023-08-16 ENCOUNTER — OFFICE VISIT (OUTPATIENT)
Dept: VASCULAR SURGERY | Facility: CLINIC | Age: 43
End: 2023-08-16
Payer: OTHER GOVERNMENT

## 2023-08-16 ENCOUNTER — PROCEDURE VISIT (OUTPATIENT)
Dept: OBSTETRICS AND GYNECOLOGY | Facility: CLINIC | Age: 43
End: 2023-08-16
Payer: OTHER GOVERNMENT

## 2023-08-16 VITALS
SYSTOLIC BLOOD PRESSURE: 100 MMHG | WEIGHT: 177.56 LBS | BODY MASS INDEX: 27 KG/M2 | HEART RATE: 87 BPM | DIASTOLIC BLOOD PRESSURE: 67 MMHG

## 2023-08-16 VITALS
BODY MASS INDEX: 26.73 KG/M2 | WEIGHT: 176.38 LBS | SYSTOLIC BLOOD PRESSURE: 108 MMHG | HEIGHT: 68 IN | DIASTOLIC BLOOD PRESSURE: 68 MMHG

## 2023-08-16 DIAGNOSIS — I87.2 VENOUS INSUFFICIENCY: Primary | ICD-10-CM

## 2023-08-16 DIAGNOSIS — Z01.812 PRE-PROCEDURE LAB EXAM: ICD-10-CM

## 2023-08-16 DIAGNOSIS — N93.9 ABNORMAL UTERINE BLEEDING: Primary | ICD-10-CM

## 2023-08-16 LAB
B-HCG UR QL: NEGATIVE
CTP QC/QA: YES

## 2023-08-16 PROCEDURE — 58100 ENDOMETRIAL BIOPSY: ICD-10-PCS | Mod: S$PBB,,, | Performed by: STUDENT IN AN ORGANIZED HEALTH CARE EDUCATION/TRAINING PROGRAM

## 2023-08-16 PROCEDURE — 99213 OFFICE O/P EST LOW 20 MIN: CPT | Mod: S$PBB,,, | Performed by: SURGERY

## 2023-08-16 PROCEDURE — 99213 PR OFFICE/OUTPT VISIT, EST, LEVL III, 20-29 MIN: ICD-10-PCS | Mod: S$PBB,,, | Performed by: SURGERY

## 2023-08-16 PROCEDURE — 99999PBSHW POCT URINE PREGNANCY: ICD-10-PCS | Mod: PBBFAC,,,

## 2023-08-16 PROCEDURE — 99999 PR PBB SHADOW E&M-EST. PATIENT-LVL III: CPT | Mod: PBBFAC,,, | Performed by: SURGERY

## 2023-08-16 PROCEDURE — 99499 NO LOS: ICD-10-PCS | Mod: S$PBB,,, | Performed by: STUDENT IN AN ORGANIZED HEALTH CARE EDUCATION/TRAINING PROGRAM

## 2023-08-16 PROCEDURE — 99999 PR PBB SHADOW E&M-EST. PATIENT-LVL III: ICD-10-PCS | Mod: PBBFAC,,, | Performed by: SURGERY

## 2023-08-16 PROCEDURE — 99213 OFFICE O/P EST LOW 20 MIN: CPT | Mod: PBBFAC,25 | Performed by: SURGERY

## 2023-08-16 PROCEDURE — 99999PBSHW POCT URINE PREGNANCY: Mod: PBBFAC,,,

## 2023-08-16 PROCEDURE — 99499 UNLISTED E&M SERVICE: CPT | Mod: S$PBB,,, | Performed by: STUDENT IN AN ORGANIZED HEALTH CARE EDUCATION/TRAINING PROGRAM

## 2023-08-16 PROCEDURE — 88305 TISSUE EXAM BY PATHOLOGIST: CPT | Mod: 26,,, | Performed by: PATHOLOGY

## 2023-08-16 PROCEDURE — 58100 BIOPSY OF UTERUS LINING: CPT | Mod: PBBFAC | Performed by: STUDENT IN AN ORGANIZED HEALTH CARE EDUCATION/TRAINING PROGRAM

## 2023-08-16 PROCEDURE — 88305 TISSUE EXAM BY PATHOLOGIST: CPT | Performed by: PATHOLOGY

## 2023-08-16 PROCEDURE — 88305 TISSUE EXAM BY PATHOLOGIST: ICD-10-PCS | Mod: 26,,, | Performed by: PATHOLOGY

## 2023-08-16 PROCEDURE — 81025 URINE PREGNANCY TEST: CPT | Mod: PBBFAC | Performed by: STUDENT IN AN ORGANIZED HEALTH CARE EDUCATION/TRAINING PROGRAM

## 2023-08-16 NOTE — TELEPHONE ENCOUNTER
Called pt to follow up and schedule 3 wk follow up    Pt agreed to be seen for 3wk EMB f/u on Friday 09-08-23 at 10:45 am with Dr. Murray    No further questions at this time      Pt stated was feeling excruciating pain in car ride home   Pain has stopped  Took 800 mg ibuprofen and will rest for the day    Informed pt will let Dr. Murray aware of symptoms    Advised pt to monitor symptoms and follow up with office if symptoms arise, worsen or persist      ND

## 2023-08-16 NOTE — PROGRESS NOTES
Lg Hendricks NP         [facility]               2023    HPI:  Alysa Jones is a 42 y.o. female with   Patient Active Problem List   Diagnosis    Tobacco use disorder    Incidental pulmonary nodule    Low back pain    History of scoliosis    GERD (gastroesophageal reflux disease)    Constipation    Migraine    Chest pain on breathing    Chest wall pain    Venous insufficiency    being managed by PCP and specialists who is here today for evaluation of  RLE edema, pain and varicose / spider veins.  Patient states location is RLE posterior knee.  Associated signs and symptoms include varicose and spider veins.  Quality is burning and bulging and severity is 5/10. Symptoms began several months ago.  Alleviating factors elevation.  Worsening factors dependency. Symptoms do not limit patient's functional status and daily activities.  Previous RLE EVLT () and multiple rounds of sclerotherapy ([id number]). Patient subsequently developed a nonocclusive clot in her right leg post EVLT. + low sodium diet.  - excessive water intake. + compression stockings for 1 month.    No MI  No Stroke  Tobacco use: No    2023:  c/o RLE symptomatic edema and spider / varicose veins despite compression and elevation > 3 mo.    Past Medical History:   Diagnosis Date    Abnormal Pap smear     moderate dysplasia    Deep vein thrombosis     GERD (gastroesophageal reflux disease)     HPV (human papilloma virus) infection     Iliac DVT (deep venous thrombosis)     right, leg    Incidental pulmonary nodule     Migraine     Scoliosis     Tobacco use disorder     Varicose veins with inflammation     Venous reflux      Past Surgical History:   Procedure Laterality Date     SECTION      2    left fibula ORIF      VARICOSE VEIN SURGERY       Family History   Problem Relation Age of Onset    Hyperlipidemia Mother     Hypertension Mother     Breast cancer Maternal Aunt 40    Cancer Maternal  Aunt     Diabetes Maternal Grandfather     Heart disease Maternal Grandfather     Breast cancer Paternal Grandmother 40    Cancer Paternal Grandmother      Social History     Socioeconomic History    Marital status:      Spouse name: Onel    Number of children: 2   Occupational History    Occupation: radiology tech     Employer: [employer]   Tobacco Use    Smoking status: Former     Current packs/day: 0.25     Types: Cigarettes    Smokeless tobacco: Never    Tobacco comments:     Pt started smoking again.    Substance and Sexual Activity    Alcohol use: Yes     Comment: occasional    Drug use: No    Sexual activity: Yes     Partners: Male   Social History Narrative    She does not exercise regularly.       Current Outpatient Medications:     butalbital-acetaminophen-caffeine -40 mg (FIORICET) -40 mg per tablet, Take 1 tablet by mouth every 4 (four) hours as needed for Pain., Disp: 90 tablet, Rfl: 0    cyclobenzaprine (FLEXERIL) 10 MG tablet, , Disp: , Rfl:     doxycycline (VIBRA-TABS) 100 MG tablet, Take 1 tablet (100 mg total) by mouth once daily., Disp: 30 tablet, Rfl: 0    doxycycline (VIBRA-TABS) 100 MG tablet, Sig 1 po qd with food and not within 1 hour of bedtime, Disp: 30 tablet, Rfl: 1    esomeprazole (NEXIUM) 40 MG capsule, Take 40 mg by mouth once daily., Disp: , Rfl:     hydrocortisone 2.5 % cream, Apply topically 2 (two) times daily as needed., Disp: , Rfl:     ibuprofen (ADVIL,MOTRIN) 200 MG tablet, Take 200 mg by mouth every 6 (six) hours as needed.  , Disp: , Rfl:     norethindrone (MICRONOR) 0.35 mg tablet, Take 1 tablet (0.35 mg total) by mouth once daily. Take at same time, every day., Disp: 30 tablet, Rfl: 3    ondansetron (ZOFRAN-ODT) 4 MG TbDL, Take 1 tablet (4 mg total) by mouth every 8 (eight) hours as needed., Disp: 40 tablet, Rfl: 1    pantoprazole (PROTONIX) 40 MG tablet, Take 1 tablet (40 mg total) by mouth once daily., Disp: 90 tablet, Rfl: 1     sulfacetamide sodium-sulfur (SULFACLEANSE 8-4) 8-4 % Susp, Wash face qhs, Disp: 473 mL, Rfl: 3    valACYclovir (VALTREX) 1000 MG tablet, Take 2 tablets (2,000 mg total) by mouth every 12 (twelve) hours. For 1-2 days at first sign of flare, Disp: 12 tablet, Rfl: 5    diazePAM (VALIUM) 2 MG tablet, Take 1 tablet (2 mg total) by mouth On call Procedure for Anxiety. BRING medication to the appointment. DO NOT take the medication until instructed to do so by doctor. MUST have someone to drive you home afterwards., Disp: 2 tablet, Rfl: 0    REVIEW OF SYSTEMS:  General: No fevers or chills; ENT: No sore throat; Allergy and Immunology: no persistent infections; Hematological and Lymphatic: No history of bleeding or easy bruising; Endocrine: negative; Respiratory: no cough, shortness of breath, or wheezing; Cardiovascular: no chest pain or dyspnea on exertion; Gastrointestinal: no abdominal pain/back, change in bowel habits, or bloody stools; Genito-Urinary: no dysuria, trouble voiding, or hematuria; Musculoskeletal: burning and aching pain; Neurological: no TIA or stroke symptoms; Psychiatric: no nervousness, anxiety or depression.    PHYSICAL EXAM:      Pulse: 87         General appearance:  Alert, well-appearing, and in no distress.  Oriented to person, place, and time                    Neurological: Normal speech, no focal findings noted; CN II - XII grossly intact. RLE with  sensation to light touch, LLE with  sensation to light touch.            Musculoskeletal: Digits/nail without cyanosis/clubbing.  Strength 5/5.                    Neck: Supple, no significant adenopathy                  Chest:  Clear to auscultation, no wheezes, rales or rhonchi, symmetric air entry. No use of accessory muscles               Cardiac: Normal rate and regular rhythm, S1 and S2 normal            Abdomen: Soft, nontender, nondistended, no masses or organomegaly, no hernia     No rebound tenderness noted; bowel sounds normal       "Extremities:   2+ R femoral pulse, 2+ L femoral pulse     2+ R popliteal pulse, 2+ L popliteal pulse     2+ R PT pulse, 2+ L PT pulse     2+ R DP pulse, 2+ L DP pulse     1+ RLE edema,  absent LLE edema    Skin: RLE +varicose/spider veins; LLE +spider veins    LAB RESULTS:  No results found for: "CBC"  Lab Results   Component Value Date    LABPROT 10.7 07/14/2023    INR 1.0 07/14/2023     Lab Results   Component Value Date     02/23/2023    K 4.3 02/23/2023     02/23/2023    CO2 23 02/23/2023     02/23/2023    BUN 16 02/23/2023    CREATININE 0.8 02/23/2023    CALCIUM 9.8 02/23/2023    ANIONGAP 9 02/23/2023    EGFRNONAA >60.0 09/19/2014     Lab Results   Component Value Date    WBC 7.53 07/12/2023    RBC 4.45 07/12/2023    HGB 13.2 07/12/2023    HCT 41.5 07/12/2023    MCV 93 07/12/2023    MCH 29.7 07/12/2023    MCHC 31.8 (L) 07/12/2023    RDW 13.8 07/12/2023     07/12/2023    MPV 12.2 07/12/2023    GRAN 5.3 07/12/2023    GRAN 70.8 07/12/2023    LYMPH 1.3 07/12/2023    LYMPH 17.3 (L) 07/12/2023    MONO 0.7 07/12/2023    MONO 9.7 07/12/2023    EOS 0.1 07/12/2023    BASO 0.02 07/12/2023    EOSINOPHIL 1.6 07/12/2023    BASOPHIL 0.3 07/12/2023    DIFFMETHOD Automated 07/12/2023     .  Lab Results   Component Value Date    HGBA1C 5.1 02/23/2023       IMAGING:  All pertinent imaging has been reviewed and interpreted independently.      Venous Insuff US 5/2023  Findings    Right Lower Venous The right external iliac vein is normal.     The right common femoral vein is normal.     The right greater saphenous vein is normal.     The right smaller saphenous vein is normal.     The right deep femoral vein is normal.     The right superficial femoral proximal vein is normal.     The right superficial femoral middle vein is normal.     The right superficial femoral distal vein is normal.    The right popliteal vein is is normal.     The right posterior tibial vein is normal.     The right peroneal vein is " normal.     The right saphenofemoral junction is normal.     There is no evidence of a right lower extremity DVT.   Right Venous Insufficiency Duplex The right common femoral vein does not have reflux.   The right greater saphenous vein has reflux.   R GSV ablated from Prox/Mid-Dist portion. I can see the very proximal portion of it at the SFJ. It reappears below the knee prox.   Left Lower Venous The left external iliac vein is normal.    The left common femoral vein is normal.     The left greater saphenous vein is normal.     The left lesser saphenous vein is normal.    The left deep femoral vein is normal.     The left superficial femoral proximal vein is normal.     The left superficial femoral middle vein is normal.     The left superficial femoral distal vein is normal.     The left popliteal vein is normal.     The left posterior tibial vein is normal.     The left peroneal vein is normal.     The left saphenofemoral junction is normal.       There is no evidence of a left lower extremity DVT.   Left Venous Insufficiency Duplex The left common femoral vein does not have reflux.   The left greater saphenous vein does not have reflux.         Medication Changes      None  Medication List      Right Venous Insufficiency Measurements    Left GSV Diameter (cm) Reflux Time (ms)   At SFJ 0.44 cm              Below knee 0.19 cm        4,133 ms          Calf 0.13 cm              Knee 0.1 cm              Below SPJ 0.17 cm                Left Venous Insufficiency Measurements    Left GSV Diameter (cm)   At SFJ 0.64 cm          Prox 0.39 cm          Distal 0.24 cm          Below knee 0.25 cm          Calf 0.21 cm          Knee 0.16 cm          Below SPJ 0.18 cm              IMP/PLAN:  42 y.o. female with   Patient Active Problem List   Diagnosis    Tobacco use disorder    Incidental pulmonary nodule    Low back pain    History of scoliosis    GERD (gastroesophageal reflux disease)    Constipation    Migraine    Chest  pain on breathing    Chest wall pain    Venous insufficiency    being managed by PCP and specialists who is here today for evaluation of RLE edema, pain and varicose / spider veins.    -recommend compression with Rx stockings, elevation, dietary changes associated with water and sodium intake discussed at length with patient  -Exercise   -Recommend warm compresses, NSAID and elevation for thrombophlebitis prn  -venous insuff  -Rec RLE sclerotherapy    I spent 15 minutes evaluating this patient and greater than 50% of the time was spent counseling, coordinator care and discussing the plan of care.  All questions were answered and patient stated understanding with agreement with the above treatment plan.    Rohit Mcfadden MD  Vascular and Endovascular Surgery

## 2023-08-16 NOTE — PROCEDURES
Endometrial biopsy    Date/Time: 8/16/2023 9:30 AM    Performed by: Zaina Murray MD  Authorized by: Zaina Murray MD    Consent:     Consent obtained:  Written    Consent given by:  Patient    Patient questions answered: yes      Patient agrees, verbalizes understanding, and wants to proceed: yes      Educational handouts given: yes      Instructions and paperwork completed: yes    Indication:     Indications: Menorrhagia    Pre-procedure:     Pre-procedure timeout performed: yes    Procedure:     Procedure: endometrial biopsy with Pipelle      Cervix cleaned and prepped: yes      A paracervical block was performed: no      An intracervical block was performed: no      The cervix was dilated: no      Uterus sounded: yes      Uterus sound depth (cm):  7.5    Curettes used:  2    Specimen collected: specimen collected and sent to pathology      Patient tolerated procedure well with no complications: yes    Comments:     Procedure comments:  UPT negative. Procedure explained. Consent forms reviewed and signed, pt was queried and without questions. Chaperone present.   Procedure without complications. Hemostasis achieved with pressure/silver nitrate at tenac sites.   Return precautions reviewed. RTC 3 weeks for results / plan.

## 2023-08-17 DIAGNOSIS — I83.899 SYMPTOMATIC SPIDER VARICOSE VEIN: Primary | ICD-10-CM

## 2023-08-22 LAB
FINAL PATHOLOGIC DIAGNOSIS: NORMAL
Lab: NORMAL

## 2023-08-23 ENCOUNTER — PATIENT MESSAGE (OUTPATIENT)
Dept: VASCULAR SURGERY | Facility: CLINIC | Age: 43
End: 2023-08-23
Payer: OTHER GOVERNMENT

## 2023-09-03 ENCOUNTER — PATIENT MESSAGE (OUTPATIENT)
Dept: INTERNAL MEDICINE | Facility: CLINIC | Age: 43
End: 2023-09-03
Payer: OTHER GOVERNMENT

## 2023-09-06 ENCOUNTER — PATIENT MESSAGE (OUTPATIENT)
Dept: VASCULAR SURGERY | Facility: CLINIC | Age: 43
End: 2023-09-06
Payer: OTHER GOVERNMENT

## 2023-09-08 ENCOUNTER — TELEPHONE (OUTPATIENT)
Dept: OBSTETRICS AND GYNECOLOGY | Facility: CLINIC | Age: 43
End: 2023-09-08
Payer: OTHER GOVERNMENT

## 2023-09-08 NOTE — TELEPHONE ENCOUNTER
Called pt back in regards to this message.      Pt agreed to reschedule after 09-25-23 appt with hematology  Pt appt scheduled on 10-06-23 for 1:30 pm at [facility] with Dr. Murray      No further questions or concerns    ND

## 2023-09-14 ENCOUNTER — PATIENT MESSAGE (OUTPATIENT)
Dept: OBSTETRICS AND GYNECOLOGY | Facility: CLINIC | Age: 43
End: 2023-09-14
Payer: OTHER GOVERNMENT

## 2023-09-18 ENCOUNTER — HOSPITAL ENCOUNTER (OUTPATIENT)
Dept: CARDIOLOGY | Facility: HOSPITAL | Age: 43
Discharge: HOME | End: 2023-09-18
Attending: SURGERY
Payer: OTHER GOVERNMENT

## 2023-09-18 DIAGNOSIS — I87.2 VENOUS INSUFFICIENCY: ICD-10-CM

## 2023-09-18 PROCEDURE — 93970 CV US LOWER VENOUS INSUFFICIENCY BILATERAL (CUPID ONLY): ICD-10-PCS | Mod: 26,,, | Performed by: SURGERY

## 2023-09-18 PROCEDURE — 93970 EXTREMITY STUDY: CPT | Mod: TC

## 2023-09-18 PROCEDURE — 93970 EXTREMITY STUDY: CPT | Mod: 26,,, | Performed by: SURGERY

## 2023-09-21 ENCOUNTER — PATIENT MESSAGE (OUTPATIENT)
Dept: VASCULAR SURGERY | Facility: CLINIC | Age: 43
End: 2023-09-21
Payer: OTHER GOVERNMENT

## 2023-09-21 ENCOUNTER — PATIENT MESSAGE (OUTPATIENT)
Dept: INTERNAL MEDICINE | Facility: CLINIC | Age: 43
End: 2023-09-21
Payer: OTHER GOVERNMENT

## 2023-09-21 LAB
LEFT GREAT SAPHENOUS DISTAL THIGH DIA: 0.37 CM
LEFT GREAT SAPHENOUS JUNCTION DIA: 0.79 CM
LEFT GREAT SAPHENOUS KNEE DIA: 0.45 CM
LEFT GREAT SAPHENOUS MIDDLE THIGH DIA: 0.35 CM
LEFT GREAT SAPHENOUS PROXIMAL CALF DIA: 0.36 CM
LEFT SMALL SAPHENOUS KNEE DIA: 0.23 CM
LEFT SMALL SAPHENOUS SPJ DIA: 0.23 CM
RIGHT GREAT SAPHENOUS JUNCTION DIA: 0.75 CM
RIGHT GREAT SAPHENOUS PROXIMAL CALF DIA: 0.15 CM
RIGHT GREAT SAPHENOUS PROXIMAL CALF REFLUX: 5308 MS
RIGHT SMALL SAPHENOUS KNEE DIA: 0.25 CM
RIGHT SMALL SAPHENOUS SPJ DIA: 0.25 CM

## 2023-09-25 ENCOUNTER — PATIENT MESSAGE (OUTPATIENT)
Dept: OBSTETRICS AND GYNECOLOGY | Facility: CLINIC | Age: 43
End: 2023-09-25
Payer: OTHER GOVERNMENT

## 2023-09-25 ENCOUNTER — PATIENT MESSAGE (OUTPATIENT)
Dept: VASCULAR SURGERY | Facility: CLINIC | Age: 43
End: 2023-09-25
Payer: OTHER GOVERNMENT

## 2023-09-25 DIAGNOSIS — N93.9 ABNORMAL UTERINE BLEEDING: ICD-10-CM

## 2023-09-25 RX ORDER — ACETAMINOPHEN AND CODEINE PHOSPHATE 120; 12 MG/5ML; MG/5ML
1 SOLUTION ORAL DAILY
Qty: 30 TABLET | Refills: 3 | Status: SHIPPED | OUTPATIENT
Start: 2023-09-25 | End: 2024-01-02 | Stop reason: SDUPTHER

## 2023-10-03 ENCOUNTER — PATIENT MESSAGE (OUTPATIENT)
Dept: VASCULAR SURGERY | Facility: CLINIC | Age: 43
End: 2023-10-03
Payer: OTHER GOVERNMENT

## 2023-10-04 ENCOUNTER — PATIENT MESSAGE (OUTPATIENT)
Dept: VASCULAR SURGERY | Facility: CLINIC | Age: 43
End: 2023-10-04
Payer: OTHER GOVERNMENT

## 2023-10-04 ENCOUNTER — TELEPHONE (OUTPATIENT)
Dept: VASCULAR SURGERY | Facility: CLINIC | Age: 43
End: 2023-10-04
Payer: OTHER GOVERNMENT

## 2023-10-06 ENCOUNTER — TELEPHONE (OUTPATIENT)
Dept: VASCULAR SURGERY | Facility: CLINIC | Age: 43
End: 2023-10-06

## 2023-10-06 ENCOUNTER — PROCEDURE VISIT (OUTPATIENT)
Dept: VASCULAR SURGERY | Facility: CLINIC | Age: 43
End: 2023-10-06
Payer: OTHER GOVERNMENT

## 2023-10-06 VITALS
HEART RATE: 80 BPM | WEIGHT: 171.06 LBS | DIASTOLIC BLOOD PRESSURE: 68 MMHG | BODY MASS INDEX: 25.92 KG/M2 | HEIGHT: 68 IN | SYSTOLIC BLOOD PRESSURE: 121 MMHG

## 2023-10-06 DIAGNOSIS — I87.2 VENOUS INSUFFICIENCY: Primary | ICD-10-CM

## 2023-10-06 DIAGNOSIS — I83.899 SYMPTOMATIC SPIDER VARICOSE VEIN: Primary | ICD-10-CM

## 2023-10-06 DIAGNOSIS — I83.899 SYMPTOMATIC SPIDER VARICOSE VEIN: ICD-10-CM

## 2023-10-06 DIAGNOSIS — I83.811 VARICOSE VEINS OF RIGHT LOWER EXTREMITY WITH PAIN: ICD-10-CM

## 2023-10-06 DIAGNOSIS — I87.2 VENOUS INSUFFICIENCY (CHRONIC) (PERIPHERAL): ICD-10-CM

## 2023-10-06 PROCEDURE — 36471 PR INJECTION THERAPY VEIN,MULT VEINS: ICD-10-PCS | Mod: RT,S$GLB,, | Performed by: SURGERY

## 2023-10-06 PROCEDURE — 36471 NJX SCLRSNT MLT INCMPTNT VN: CPT | Mod: RT,S$GLB,, | Performed by: SURGERY

## 2023-10-06 NOTE — PROCEDURES
10/06/2023    Pre-Procedure Diagnosis:   1. Right lower extremity spider veins  2. Right lower extremity reticular veins    Post-Procedure Diagnsosis: Same    Procedure: Right lower extremity sclerotherapy with Asclera 2%    Surgeon: MD LARY Evangelista    Anesthesia: Asclera     Estimated blood loss: <5cc    Complications: None       Time out performed and patient was prepped and draped in sterile fashion.  Vein light used to identify reticular veins feeding the right lower extremity spider veins to the entire anterior aspect.  Skin overlying affected areas cleaned with alcohol.  Reticular veins were then injected with 0.5% Asclera foam sclerosant created with Tessari method with resolution of reticular veins.  Next the spider veins were identified and the overlying skin was cleaned with alcohol.  The veins were accessed with a 30 gauge needle and injected with 0.25% Asclera liquid sclerosant with near resolution of all treated areas.  Once all areas of concern to the patient were treated, sterile cotton balls and Tegaderms were applied for light compression as well as ice for 5 minutes.  We then placed a Coban wrap over the treated areas followed by compression stocking.  The patient ambulated after the procedure without issues.      MD LARY Evangelista  [facility]

## 2023-10-23 DIAGNOSIS — K21.9 GASTROESOPHAGEAL REFLUX DISEASE WITHOUT ESOPHAGITIS: ICD-10-CM

## 2023-10-23 RX ORDER — ONDANSETRON 4 MG/1
TABLET, ORALLY DISINTEGRATING ORAL
Qty: 40 TABLET | Refills: 1 | Status: SHIPPED | OUTPATIENT
Start: 2023-10-23

## 2023-10-23 NOTE — TELEPHONE ENCOUNTER
No care due was identified.  Health [facility] Embedded Care Due Messages. Reference number: [id number].   10/23/2023 9:34:55 AM CDT

## 2023-11-08 ENCOUNTER — OFFICE VISIT (OUTPATIENT)
Dept: DERMATOLOGY | Facility: CLINIC | Age: 43
End: 2023-11-08
Payer: OTHER GOVERNMENT

## 2023-11-08 DIAGNOSIS — L82.0 INFLAMED SEBORRHEIC KERATOSIS: Primary | ICD-10-CM

## 2023-11-08 DIAGNOSIS — L70.0 ACNE VULGARIS: ICD-10-CM

## 2023-11-08 DIAGNOSIS — L72.0 MILIA: ICD-10-CM

## 2023-11-08 PROCEDURE — 99213 OFFICE O/P EST LOW 20 MIN: CPT | Mod: PBBFAC,PO | Performed by: DERMATOLOGY

## 2023-11-08 PROCEDURE — 99213 OFFICE O/P EST LOW 20 MIN: CPT | Mod: 25,S$PBB,, | Performed by: DERMATOLOGY

## 2023-11-08 PROCEDURE — 99999 PR PBB SHADOW E&M-EST. PATIENT-LVL III: ICD-10-PCS | Mod: PBBFAC,,, | Performed by: DERMATOLOGY

## 2023-11-08 PROCEDURE — 99999 PR PBB SHADOW E&M-EST. PATIENT-LVL III: CPT | Mod: PBBFAC,,, | Performed by: DERMATOLOGY

## 2023-11-08 PROCEDURE — 99213 PR OFFICE/OUTPT VISIT, EST, LEVL III, 20-29 MIN: ICD-10-PCS | Mod: 25,S$PBB,, | Performed by: DERMATOLOGY

## 2023-11-08 PROCEDURE — 17110 DESTRUCTION B9 LES UP TO 14: CPT | Mod: S$PBB,,, | Performed by: DERMATOLOGY

## 2023-11-08 PROCEDURE — 17110 DESTRUCTION B9 LES UP TO 14: CPT | Mod: PBBFAC,PO | Performed by: DERMATOLOGY

## 2023-11-08 PROCEDURE — 17110 PR DESTRUCTION BENIGN LESIONS UP TO 14: ICD-10-PCS | Mod: S$PBB,,, | Performed by: DERMATOLOGY

## 2023-11-08 NOTE — PATIENT INSTRUCTIONS
PM:  Wash with sulfacleanser  Thin film of rosacea triple cream + clindamycin all over every other to every night   Moisturize with cerave pm or vanicream daily moisturizer to minimize irritation    AM:  Wash with mild cleanser  3. Moisturizer with spf 30 +    A tint is recommended too- such as makeup, tinted sunscreen, BB/CC creams. The iron oxide in the tint of products protects against agents other than UV light that damage our skin such as blue light from screens, infrared heat, visible light, and other other environmental pollutants.  These other factors can contribute significantly to irregular pigment, especially in skin of color and tint helps protect from these factors.     We would like to see you back in the clinic in 12 months.  You will be able to schedule this appointment by calling or by using your [facility] portal 3 months before this time. Because our schedule fills so quickly, please set a reminder in your phone or on your calendar to schedule 3 months before you are due to come in so that we can see you in a timely fashion.  You should also receive a reminder from us in the mail. This will help us ensure we can continue to provide excellent healthcare for you. Thank you.

## 2023-11-08 NOTE — PROGRESS NOTES
"  Subjective:      Patient ID:  Alysa Jones is a 43 y.o. female who presents for   Chief Complaint   Patient presents with    Seborrheic Keratosis     Back     Pt here today for  lesions on back. Irritated by her bra .  Itches. Would like them removed.   Pt c/o "white" lesion to face. Comes and goes. Flares up. Did not resolve with doxy and triple cream .      Pt also states she needs refill on doxycycline    Seborrheic Keratosis      Review of Systems   Skin:  Positive for daily sunscreen use and activity-related sunscreen use.   Hematologic/Lymphatic: Bruises/bleeds easily (bruise).       Objective:   Physical Exam   Constitutional: She appears well-developed and well-nourished. No distress.   Neurological: She is alert and oriented to person, place, and time. She is not disoriented.   Psychiatric: She has a normal mood and affect.   Skin:   Areas Examined (abnormalities noted in diagram):   Head / Face Inspection Performed  Back Inspection Performed                 Diagram Legend     Erythematous scaling macule/papule c/w actinic keratosis       Vascular papule c/w angioma      Pigmented verrucoid papule/plaque c/w seborrheic keratosis      Yellow umbilicated papule c/w sebaceous hyperplasia      Irregularly shaped tan macule c/w lentigo     1-2 mm smooth white papules consistent with Milia      Movable subcutaneous cyst with punctum c/w epidermal inclusion cyst      Subcutaneous movable cyst c/w pilar cyst      Firm pink to brown papule c/w dermatofibroma      Pedunculated fleshy papule(s) c/w skin tag(s)      Evenly pigmented macule c/w junctional nevus     Mildly variegated pigmented, slightly irregular-bordered macule c/w mildly atypical nevus      Flesh colored to evenly pigmented papule c/w intradermal nevus       Pink pearly papule/plaque c/w basal cell carcinoma      Erythematous hyperkeratotic cursted plaque c/w SCC      Surgical scar with no sign of skin cancer recurrence      Open and closed " comedones      Inflammatory papules and pustules      Verrucoid papule consistent consistent with wart     Erythematous eczematous patches and plaques     Dystrophic onycholytic nail with subungual debris c/w onychomycosis     Umbilicated papule    Erythematous-base heme-crusted tan verrucoid plaque consistent with inflamed seborrheic keratosis     Erythematous Silvery Scaling Plaque c/w Psoriasis     See annotation      Assessment / Plan:        Inflamed seborrheic keratosis  Procedure note for destruction via hyfrecation and curettage:    Verbal consent obtained. Risks of recurrence and scarring discussed.   2 lesions cleaned with alcohol and anesthetized with 1% lidocaine with epinephrine. Areas then lightly hyfrecated and curetted to remove gross lesion. Hemostasis achieved with aluminum chloride application. No complications.   Areas dressed with Vaseline jelly and bandage. Wound care discussed.    Acne vulgaris  PM:  Wash with sulfacleanser  Thin film of rosacea triple cream + clindamycin all over every other to every night   Moisturize with cerave pm or vanicream daily moisturizer to minimize irritation    AM:  Wash with mild cleanser  3. Moisturizer with spf 30 +    A tint is recommended too- such as makeup, tinted sunscreen, BB/CC creams. The iron oxide in the tint of products protects against agents other than UV light that damage our skin such as blue light from screens, infrared heat, visible light, and other other environmental pollutants.  These other factors can contribute significantly to irregular pigment, especially in skin of color and tint helps protect from these factors.     Milia/sebaceous hyperplasia  reassurance           Follow up in 1 year (on 11/8/2024) for Medication Management.

## 2023-11-09 ENCOUNTER — PATIENT MESSAGE (OUTPATIENT)
Dept: DERMATOLOGY | Facility: CLINIC | Age: 43
End: 2023-11-09
Payer: OTHER GOVERNMENT

## 2023-11-10 ENCOUNTER — PATIENT MESSAGE (OUTPATIENT)
Dept: DERMATOLOGY | Facility: CLINIC | Age: 43
End: 2023-11-10
Payer: OTHER GOVERNMENT

## 2023-11-10 RX ORDER — MUPIROCIN 20 MG/G
OINTMENT TOPICAL
Qty: 30 G | Refills: 3 | Status: SHIPPED | OUTPATIENT
Start: 2023-11-10

## 2023-11-16 ENCOUNTER — PATIENT MESSAGE (OUTPATIENT)
Dept: VASCULAR SURGERY | Facility: CLINIC | Age: 43
End: 2023-11-16
Payer: OTHER GOVERNMENT

## 2023-11-17 DIAGNOSIS — F41.9 ANXIETY DUE TO INVASIVE PROCEDURE: Primary | ICD-10-CM

## 2023-11-17 RX ORDER — ALPRAZOLAM 0.5 MG/1
TABLET ORAL
Qty: 2 TABLET | Refills: 0 | Status: SHIPPED | OUTPATIENT
Start: 2023-11-17 | End: 2023-12-15 | Stop reason: SDUPTHER

## 2023-11-24 ENCOUNTER — PATIENT MESSAGE (OUTPATIENT)
Dept: OBSTETRICS AND GYNECOLOGY | Facility: CLINIC | Age: 43
End: 2023-11-24
Payer: OTHER GOVERNMENT

## 2023-11-28 ENCOUNTER — PATIENT MESSAGE (OUTPATIENT)
Dept: INTERNAL MEDICINE | Facility: CLINIC | Age: 43
End: 2023-11-28
Payer: OTHER GOVERNMENT

## 2023-11-28 DIAGNOSIS — I87.2 VENOUS INSUFFICIENCY: Primary | ICD-10-CM

## 2023-11-28 NOTE — TELEPHONE ENCOUNTER
Spoke with patient and advised Dr. Sims is out on vacation and won't return until 11/30. Advised I will forward her message to Dr. Sims and notify him on his return. Patient stated that she is continuing care with Dr. Mcfadden. Forward to

## 2023-12-04 ENCOUNTER — PATIENT MESSAGE (OUTPATIENT)
Dept: DERMATOLOGY | Facility: CLINIC | Age: 43
End: 2023-12-04
Payer: OTHER GOVERNMENT

## 2023-12-15 ENCOUNTER — TELEPHONE (OUTPATIENT)
Dept: VASCULAR SURGERY | Facility: CLINIC | Age: 43
End: 2023-12-15
Payer: OTHER GOVERNMENT

## 2023-12-15 DIAGNOSIS — F41.9 ANXIETY DUE TO INVASIVE PROCEDURE: ICD-10-CM

## 2023-12-15 NOTE — TELEPHONE ENCOUNTER
Spoke with [facility] in authorization. States she needs to know which veins we will be treating, reflux?, and medication that we will use. Per Dr. Mcfadden: patients symptomatic spider vein tributaries were so extensive that he was unable to complete therapy in one appointment. It was at that time he scheduled the patient for a second session of Sclerotherapy to the right lower extremity, posterior and lateral thigh.  Dr. Mcfadden states that symptomatic spider vein tributaries can not be assessed for reflux, as they are superficial veins.  He states he will use  Asclera (polidocanol) 1% to inject the spider tributaries.

## 2023-12-16 RX ORDER — ALPRAZOLAM 0.5 MG/1
TABLET ORAL
Qty: 2 TABLET | Refills: 0 | Status: SHIPPED | OUTPATIENT
Start: 2023-12-16 | End: 2023-12-27

## 2023-12-17 ENCOUNTER — PATIENT MESSAGE (OUTPATIENT)
Dept: VASCULAR SURGERY | Facility: CLINIC | Age: 43
End: 2023-12-17
Payer: OTHER GOVERNMENT

## 2023-12-25 ENCOUNTER — PATIENT MESSAGE (OUTPATIENT)
Dept: OBSTETRICS AND GYNECOLOGY | Facility: CLINIC | Age: 43
End: 2023-12-25
Payer: OTHER GOVERNMENT

## 2023-12-25 DIAGNOSIS — N93.9 ABNORMAL UTERINE BLEEDING: ICD-10-CM

## 2023-12-26 ENCOUNTER — PATIENT MESSAGE (OUTPATIENT)
Dept: UROLOGY | Facility: CLINIC | Age: 43
End: 2023-12-26
Payer: OTHER GOVERNMENT

## 2023-12-27 ENCOUNTER — PATIENT MESSAGE (OUTPATIENT)
Dept: VASCULAR SURGERY | Facility: CLINIC | Age: 43
End: 2023-12-27
Payer: OTHER GOVERNMENT

## 2023-12-27 DIAGNOSIS — F41.9 ANXIETY DUE TO INVASIVE PROCEDURE: Primary | ICD-10-CM

## 2023-12-27 DIAGNOSIS — Z91.89 AT HIGH RISK FOR PAIN FROM PROCEDURE: ICD-10-CM

## 2023-12-27 RX ORDER — MELOXICAM 7.5 MG/1
7.5 TABLET ORAL 2 TIMES DAILY
Qty: 14 TABLET | Refills: 0 | Status: CANCELLED | OUTPATIENT
Start: 2023-12-27 | End: 2024-01-03

## 2023-12-27 RX ORDER — ALPRAZOLAM 0.5 MG/1
TABLET ORAL
Qty: 2 TABLET | Refills: 0 | Status: SHIPPED | OUTPATIENT
Start: 2023-12-27

## 2023-12-27 RX ORDER — ALPRAZOLAM 0.5 MG/1
TABLET ORAL
Qty: 2 TABLET | Refills: 0 | Status: CANCELLED | OUTPATIENT
Start: 2023-12-27

## 2023-12-28 ENCOUNTER — PATIENT MESSAGE (OUTPATIENT)
Dept: VASCULAR SURGERY | Facility: CLINIC | Age: 43
End: 2023-12-28
Payer: OTHER GOVERNMENT

## 2024-01-02 ENCOUNTER — PATIENT MESSAGE (OUTPATIENT)
Dept: DERMATOLOGY | Facility: CLINIC | Age: 44
End: 2024-01-02
Payer: OTHER GOVERNMENT

## 2024-01-02 RX ORDER — ACETAMINOPHEN AND CODEINE PHOSPHATE 120; 12 MG/5ML; MG/5ML
1 SOLUTION ORAL DAILY
Qty: 90 TABLET | Refills: 2 | Status: SHIPPED | OUTPATIENT
Start: 2024-01-02 | End: 2024-01-28

## 2024-01-08 ENCOUNTER — PATIENT MESSAGE (OUTPATIENT)
Dept: VASCULAR SURGERY | Facility: CLINIC | Age: 44
End: 2024-01-08

## 2024-01-10 ENCOUNTER — PATIENT MESSAGE (OUTPATIENT)
Dept: VASCULAR SURGERY | Facility: CLINIC | Age: 44
End: 2024-01-10
Payer: OTHER GOVERNMENT

## 2024-01-10 DIAGNOSIS — F41.9 ANXIETY DUE TO INVASIVE PROCEDURE: ICD-10-CM

## 2024-01-10 RX ORDER — ALPRAZOLAM 0.5 MG/1
TABLET ORAL
Qty: 2 TABLET | Refills: 0 | Status: SHIPPED | OUTPATIENT
Start: 2024-01-10

## 2024-01-12 ENCOUNTER — PROCEDURE VISIT (OUTPATIENT)
Dept: VASCULAR SURGERY | Facility: CLINIC | Age: 44
End: 2024-01-12
Payer: OTHER GOVERNMENT

## 2024-01-12 VITALS
BODY MASS INDEX: 25.92 KG/M2 | DIASTOLIC BLOOD PRESSURE: 90 MMHG | HEART RATE: 91 BPM | SYSTOLIC BLOOD PRESSURE: 130 MMHG | WEIGHT: 171.06 LBS | HEIGHT: 68 IN

## 2024-01-12 DIAGNOSIS — I87.2 VENOUS INSUFFICIENCY: ICD-10-CM

## 2024-01-12 DIAGNOSIS — I87.2 VENOUS INSUFFICIENCY (CHRONIC) (PERIPHERAL): ICD-10-CM

## 2024-01-12 DIAGNOSIS — I83.811 VARICOSE VEINS OF RIGHT LOWER EXTREMITY WITH PAIN: ICD-10-CM

## 2024-01-12 DIAGNOSIS — I83.899 SYMPTOMATIC SPIDER VARICOSE VEIN: Primary | ICD-10-CM

## 2024-01-12 PROCEDURE — 36471 NJX SCLRSNT MLT INCMPTNT VN: CPT | Mod: LT,S$GLB,, | Performed by: SURGERY

## 2024-01-12 RX ORDER — SEMAGLUTIDE 1.34 MG/ML
0.5 INJECTION, SOLUTION SUBCUTANEOUS
COMMUNITY

## 2024-01-12 NOTE — PROCEDURES
01/12/2024  Alysa Jones      Pre-Procedure Diagnosis:   1. Right lower extremity spider veins  2. Right lower extremity reticular veins    Post-Procedure Diagnsosis: Same    Procedure: Right lower extremity sclerotherapy with Asclera 2%    Surgeon: MD LARY Evangelista    Anesthesia: Asclera     Estimated blood loss: <5cc    Complications: None       Time out performed and patient was prepped and draped in sterile fashion.  Vein light used to identify reticular veins feeding right lower extremity spider veins of the medial thigh/knee and lower leg with lateral thigh.  Skin overlying affected areas cleaned with alcohol.  Reticular veins were then injected with 0.5% Asclera foam sclerosant created with Tessari method with resolution of reticular veins.  Next the spider veins were identified and the overlying skin was cleaned with alcohol.  The veins were accessed with a 30 gauge needle and injected with 0.25% Asclera liquid sclerosant with near resolution of all treated areas.  Once all areas of concern to the patient were treated, sterile cotton balls and Tegaderms were applied for light compression as well as ice for 5 minutes.  We then placed a Coban wrap over the treated areas followed by compression stocking.  The patient ambulated after the procedure without issues.      MD LARY Evangelista  [facility]

## 2024-01-20 ENCOUNTER — PATIENT MESSAGE (OUTPATIENT)
Dept: OBSTETRICS AND GYNECOLOGY | Facility: CLINIC | Age: 44
End: 2024-01-20
Payer: OTHER GOVERNMENT

## 2024-01-23 ENCOUNTER — PATIENT MESSAGE (OUTPATIENT)
Dept: OBSTETRICS AND GYNECOLOGY | Facility: CLINIC | Age: 44
End: 2024-01-23
Payer: OTHER GOVERNMENT

## 2024-01-23 NOTE — TELEPHONE ENCOUNTER
Called pharmacy   Stated pt picked up Rx yesterday for 3 month supply    Called pt in regards to portal message  No answer  Lvm and call back number    Sent pt portal response    ND

## 2024-01-27 DIAGNOSIS — N93.9 ABNORMAL UTERINE BLEEDING: ICD-10-CM

## 2024-01-28 RX ORDER — NORETHINDRONE 0.35 MG/1
TABLET ORAL
Qty: 90 TABLET | Refills: 1 | Status: SHIPPED | OUTPATIENT
Start: 2024-01-28

## 2024-01-28 NOTE — TELEPHONE ENCOUNTER
Refill Decision Note   Alysa Jones  is requesting a refill authorization.  Brief Assessment and Rationale for Refill:  Approve     Medication Therapy Plan:         Comments:     Note composed:4:19 AM 01/28/2024

## 2024-01-29 ENCOUNTER — PATIENT MESSAGE (OUTPATIENT)
Dept: VASCULAR SURGERY | Facility: CLINIC | Age: 44
End: 2024-01-29
Payer: OTHER GOVERNMENT

## 2024-01-30 ENCOUNTER — PATIENT MESSAGE (OUTPATIENT)
Dept: VASCULAR SURGERY | Facility: CLINIC | Age: 44
End: 2024-01-30
Payer: OTHER GOVERNMENT

## 2024-03-04 ENCOUNTER — TELEPHONE (OUTPATIENT)
Dept: VASCULAR SURGERY | Facility: CLINIC | Age: 44
End: 2024-03-04
Payer: OTHER GOVERNMENT

## 2024-05-02 ENCOUNTER — PATIENT MESSAGE (OUTPATIENT)
Dept: INTERNAL MEDICINE | Facility: CLINIC | Age: 44
End: 2024-05-02
Payer: OTHER GOVERNMENT

## 2024-05-06 ENCOUNTER — OFFICE VISIT (OUTPATIENT)
Dept: DERMATOLOGY | Facility: CLINIC | Age: 44
End: 2024-05-06
Payer: OTHER GOVERNMENT

## 2024-05-06 DIAGNOSIS — L72.0 EPIDERMAL CYST: Primary | ICD-10-CM

## 2024-05-06 PROCEDURE — 99999 PR PBB SHADOW E&M-EST. PATIENT-LVL III: CPT | Mod: PBBFAC,,, | Performed by: DERMATOLOGY

## 2024-05-06 PROCEDURE — 99213 OFFICE O/P EST LOW 20 MIN: CPT | Mod: PBBFAC,PO | Performed by: DERMATOLOGY

## 2024-05-06 PROCEDURE — 99212 OFFICE O/P EST SF 10 MIN: CPT | Mod: S$PBB,,, | Performed by: DERMATOLOGY

## 2024-05-06 NOTE — PROGRESS NOTES
Subjective:      Patient ID:  Alysa Jones is a 43 y.o. female who presents for   Chief Complaint   Patient presents with    Lesion     Lower back      Pt presents today for lump on lower back   Pt states started out as a pimple and has grown in the last year    Pt states it is hard       Review of Systems   Skin:  Positive for daily sunscreen use and activity-related sunscreen use.   Hematologic/Lymphatic: Bruises/bleeds easily (bruise).       Objective:   Physical Exam   Skin:   Areas Examined (abnormalities noted in diagram):   Back Inspection Performed            Diagram Legend     Erythematous scaling macule/papule c/w actinic keratosis       Vascular papule c/w angioma      Pigmented verrucoid papule/plaque c/w seborrheic keratosis      Yellow umbilicated papule c/w sebaceous hyperplasia      Irregularly shaped tan macule c/w lentigo     1-2 mm smooth white papules consistent with Milia      Movable subcutaneous cyst with punctum c/w epidermal inclusion cyst      Subcutaneous movable cyst c/w pilar cyst      Firm pink to brown papule c/w dermatofibroma      Pedunculated fleshy papule(s) c/w skin tag(s)      Evenly pigmented macule c/w junctional nevus     Mildly variegated pigmented, slightly irregular-bordered macule c/w mildly atypical nevus      Flesh colored to evenly pigmented papule c/w intradermal nevus       Pink pearly papule/plaque c/w basal cell carcinoma      Erythematous hyperkeratotic cursted plaque c/w SCC      Surgical scar with no sign of skin cancer recurrence      Open and closed comedones      Inflammatory papules and pustules      Verrucoid papule consistent consistent with wart     Erythematous eczematous patches and plaques     Dystrophic onycholytic nail with subungual debris c/w onychomycosis     Umbilicated papule    Erythematous-base heme-crusted tan verrucoid plaque consistent with inflamed seborrheic keratosis     Erythematous Silvery Scaling Plaque c/w Psoriasis     See  annotation      Assessment / Plan:        Epidermal cyst  Reassurance   Avoid manipulation   Can use retin a nightly on it   Discussed risks of punch bx including scar, recurrence , infection and pt elected to observe lesion for now as not inflamed            Follow up if symptoms worsen or fail to improve.

## 2024-05-08 ENCOUNTER — HOSPITAL ENCOUNTER (OUTPATIENT)
Dept: RADIOLOGY | Facility: HOSPITAL | Age: 44
Discharge: HOME | End: 2024-05-08
Attending: FAMILY MEDICINE
Payer: OTHER GOVERNMENT

## 2024-05-08 VITALS — BODY MASS INDEX: 25.91 KG/M2 | WEIGHT: 171 LBS | HEIGHT: 68 IN

## 2024-05-08 DIAGNOSIS — Z12.31 ENCOUNTER FOR SCREENING MAMMOGRAM FOR BREAST CANCER: ICD-10-CM

## 2024-05-08 PROCEDURE — 77067 SCR MAMMO BI INCL CAD: CPT | Mod: 26,,, | Performed by: RADIOLOGY

## 2024-05-08 PROCEDURE — 77067 SCR MAMMO BI INCL CAD: CPT | Mod: TC

## 2024-05-08 PROCEDURE — 77063 BREAST TOMOSYNTHESIS BI: CPT | Mod: 26,,, | Performed by: RADIOLOGY

## 2024-05-10 RX ORDER — ESOMEPRAZOLE MAGNESIUM 40 MG/1
40 CAPSULE, DELAYED RELEASE ORAL
Qty: 30 CAPSULE | Refills: 5 | Status: SHIPPED | OUTPATIENT
Start: 2024-05-10

## 2024-05-10 NOTE — TELEPHONE ENCOUNTER
No care due was identified.  Health [facility] Embedded Care Due Messages. Reference number: [id number].   5/10/2024 4:15:58 AM CDT

## 2024-05-17 ENCOUNTER — PATIENT OUTREACH (OUTPATIENT)
Dept: ADMINISTRATIVE | Facility: HOSPITAL | Age: 44
End: 2024-05-17
Payer: OTHER GOVERNMENT

## 2024-05-21 ENCOUNTER — PATIENT MESSAGE (OUTPATIENT)
Dept: INTERNAL MEDICINE | Facility: CLINIC | Age: 44
End: 2024-05-21
Payer: OTHER GOVERNMENT

## 2024-05-22 ENCOUNTER — OFFICE VISIT (OUTPATIENT)
Dept: INTERNAL MEDICINE | Facility: CLINIC | Age: 44
End: 2024-05-22
Payer: OTHER GOVERNMENT

## 2024-05-22 DIAGNOSIS — H00.12 CHALAZION RIGHT LOWER EYELID: Primary | ICD-10-CM

## 2024-05-22 PROCEDURE — 99213 OFFICE O/P EST LOW 20 MIN: CPT | Mod: 95,,, | Performed by: FAMILY MEDICINE

## 2024-05-22 RX ORDER — ERYTHROMYCIN 5 MG/G
OINTMENT OPHTHALMIC 3 TIMES DAILY
Qty: 3.5 G | Refills: 0 | Status: SHIPPED | OUTPATIENT
Start: 2024-05-22 | End: 2024-05-31

## 2024-05-22 NOTE — PROGRESS NOTES
Subjective:       Patient ID: Alysa Jones is a 43 y.o. female.    The patient location is: [place]  The chief complaint leading to consultation is: No chief complaint on file.        Visit type: audiovisual    Face to Face time with patient: 5 minutes of total time spent on the encounter, which includes face to face time and non-face to face time preparing to see the patient (eg, review of tests), Obtaining and/or reviewing separately obtained history, Documenting clinical information in the electronic or other health record, Independently interpreting results (not separately reported) and communicating results to the patient/family/caregiver, or Care coordination (not separately reported).       Each patient to whom he or she provides medical services by telemedicine is:  (1) informed of the relationship between the physician and patient and the respective role of any other health care provider with respect to management of the patient; and (2) notified that he or she may decline to receive medical services by telemedicine and may withdraw from such care at any time.    Notes:     HPI  History of Present Illness    Inflamed stye on right lower eyelid with some intermittent drainage over last several days. Has done some warm compresses but no other treatment yet. No eyeball issues. No visual disturbances    All of your core healthy metrics are met.    Social History     Social History Narrative    She does not exercise regularly.       Family History   Problem Relation Name Age of Onset    Hyperlipidemia Mother      Hypertension Mother      Breast cancer Maternal Aunt  40    Cancer Maternal Aunt      Diabetes Maternal Grandfather      Heart disease Maternal Grandfather      Breast cancer Paternal Grandmother  40    Cancer Paternal Grandmother         Current Outpatient Medications:     ALPRAZolam (XANAX) 0.5 MG tablet, Take one tablet by mouth 1 hour before procedure and bring other tablet with you to the  procedure., Disp: 2 tablet, Rfl: 0    ALPRAZolam (XANAX) 0.5 MG tablet, Take one tablet by mouth 1 hour before procedure and bring other tablet with you to the procedure., Disp: 2 tablet, Rfl: 0    butalbital-acetaminophen-caffeine -40 mg (FIORICET) -40 mg per tablet, Take 1 tablet by mouth every 4 (four) hours as needed for Pain., Disp: 90 tablet, Rfl: 0    cyclobenzaprine (FLEXERIL) 10 MG tablet, , Disp: , Rfl:     diazePAM (VALIUM) 2 MG tablet, Take 1 tablet (2 mg total) by mouth On call Procedure for Anxiety. BRING medication to the appointment. DO NOT take the medication until instructed to do so by doctor. MUST have someone to drive you home afterwards., Disp: 2 tablet, Rfl: 0    doxycycline (VIBRA-TABS) 100 MG tablet, Take 1 tablet (100 mg total) by mouth once daily., Disp: 30 tablet, Rfl: 0    doxycycline (VIBRA-TABS) 100 MG tablet, Sig 1 po qd with food and not within 1 hour of bedtime, Disp: 30 tablet, Rfl: 1    erythromycin (ROMYCIN) ophthalmic ointment, Place into the right eye 3 (three) times daily., Disp: 3.5 g, Rfl: 0    esomeprazole (NEXIUM) 40 MG capsule, TAKE 1 CAPSULE(40 MG) BY MOUTH BEFORE BREAKFAST, Disp: 30 capsule, Rfl: 5    hydrocortisone 2.5 % cream, Apply topically 2 (two) times daily as needed., Disp: , Rfl:     ibuprofen (ADVIL,MOTRIN) 200 MG tablet, Take 200 mg by mouth every 6 (six) hours as needed.  , Disp: , Rfl:     mupirocin (BACTROBAN) 2 % ointment, AAA bid, Disp: 30 g, Rfl: 3    norethindrone (MICRONOR) 0.35 mg tablet, TAKE 1 TABLET(0.35 MG) BY MOUTH EVERY DAY AT THE SAME TIME, Disp: 90 tablet, Rfl: 1    ondansetron (ZOFRAN-ODT) 4 MG TbDL, DISSOLVE 1 TABLET(4 MG) ON THE TONGUE EVERY 8 HOURS AS NEEDED, Disp: 40 tablet, Rfl: 1    pantoprazole (PROTONIX) 40 MG tablet, Take 1 tablet (40 mg total) by mouth once daily., Disp: 90 tablet, Rfl: 1    semaglutide (OZEMPIC) 0.25 mg or 0.5 mg(2 mg/1.5 mL) pen injector, Inject 0.5 mg into the skin every 7 days., Disp: , Rfl:      sulfacetamide sodium-sulfur (SULFACLEANSE 8-4) 8-4 % Susp, Wash face qhs, Disp: 473 mL, Rfl: 3    valACYclovir (VALTREX) 1000 MG tablet, Take 2 tablets (2,000 mg total) by mouth every 12 (twelve) hours. For 1-2 days at first sign of flare, Disp: 12 tablet, Rfl: 5    Current Facility-Administered Medications:     polidocanoL 1 % (20 mg/2 mL) injection Soln 0.2 mL, 0.2 mL, Intravenous, 1 time in Clinic/HOD, Rohit Mcfadden MD    polidocanoL 1 % (20 mg/2 mL) injection Soln 0.2 mL, 0.2 mL, Intravenous, 1 time in Clinic/HOD, Rohit Mcfadden MD    Review of Systems   Constitutional:  Negative for activity change and unexpected weight change.   HENT:  Negative for hearing loss, rhinorrhea and trouble swallowing.    Eyes:  Positive for discharge. Negative for visual disturbance.   Respiratory:  Negative for chest tightness and wheezing.    Cardiovascular:  Negative for chest pain and palpitations.   Gastrointestinal:  Negative for blood in stool, constipation, diarrhea and vomiting.   Endocrine: Negative for polydipsia and polyuria.   Genitourinary:  Negative for difficulty urinating, dysuria, hematuria and menstrual problem.   Musculoskeletal:  Negative for arthralgias, joint swelling and neck pain.   Neurological:  Negative for weakness and headaches.   Psychiatric/Behavioral:  Negative for confusion and dysphoric mood.        Objective:   There were no vitals taken for this visit.     Physical Exam  Constitutional:       General: She is not in acute distress.     Appearance: She is well-developed.   HENT:      Head: Normocephalic.   Eyes:      Comments: Right lower eyelid with inflamed stye.   Pulmonary:      Effort: Pulmonary effort is normal. No respiratory distress.   Neurological:      Mental Status: She is alert and oriented to person, place, and time.   Psychiatric:         Behavior: Behavior normal.         Physical Exam      @resultssec@  Assessment & Plan     Assessment & Plan      Problem List Items  Addressed This Visit          Ophtho    Chalazion right lower eyelid - Primary    Relevant Medications    erythromycin (ROMYCIN) ophthalmic ointment     Advised warm compresses and erythromycin      Immunizations Administered on Date of Encounter - 5/22/2024       No immunizations on file.             No follow-ups on file.          Disclaimer:  This note may have been prepared using voice recognition software, it may have not been extensively proofed, as such there could be errors within the text such as sound alike errors.

## 2024-05-23 PROBLEM — H00.12 CHALAZION RIGHT LOWER EYELID: Status: ACTIVE | Noted: 2024-05-23

## 2024-05-30 ENCOUNTER — PATIENT MESSAGE (OUTPATIENT)
Dept: DERMATOLOGY | Facility: CLINIC | Age: 44
End: 2024-05-30
Payer: OTHER GOVERNMENT

## 2024-05-31 ENCOUNTER — LAB VISIT (OUTPATIENT)
Dept: LAB | Facility: OTHER | Age: 44
End: 2024-05-31
Attending: FAMILY MEDICINE
Payer: OTHER GOVERNMENT

## 2024-05-31 ENCOUNTER — OFFICE VISIT (OUTPATIENT)
Dept: INTERNAL MEDICINE | Facility: CLINIC | Age: 44
End: 2024-05-31
Payer: OTHER GOVERNMENT

## 2024-05-31 VITALS
HEIGHT: 68 IN | HEART RATE: 71 BPM | BODY MASS INDEX: 26 KG/M2 | DIASTOLIC BLOOD PRESSURE: 71 MMHG | OXYGEN SATURATION: 99 % | SYSTOLIC BLOOD PRESSURE: 103 MMHG

## 2024-05-31 DIAGNOSIS — Z00.00 ROUTINE HEALTH MAINTENANCE: ICD-10-CM

## 2024-05-31 DIAGNOSIS — Z00.00 ROUTINE HEALTH MAINTENANCE: Primary | ICD-10-CM

## 2024-05-31 DIAGNOSIS — M79.89 LEFT ARM SWELLING: ICD-10-CM

## 2024-05-31 DIAGNOSIS — R00.0 TACHYCARDIA: ICD-10-CM

## 2024-05-31 LAB
ALBUMIN SERPL BCP-MCNC: 4.5 G/DL (ref 3.5–5.2)
ALP SERPL-CCNC: 39 U/L (ref 55–135)
ALT SERPL W/O P-5'-P-CCNC: 20 U/L (ref 10–44)
ANION GAP SERPL CALC-SCNC: 10 MMOL/L (ref 8–16)
AST SERPL-CCNC: 15 U/L (ref 10–40)
BASOPHILS # BLD AUTO: 0.04 K/UL (ref 0–0.2)
BASOPHILS NFR BLD: 0.5 % (ref 0–1.9)
BILIRUB SERPL-MCNC: 0.5 MG/DL (ref 0.1–1)
BUN SERPL-MCNC: 12 MG/DL (ref 6–20)
CALCIUM SERPL-MCNC: 10.3 MG/DL (ref 8.7–10.5)
CHLORIDE SERPL-SCNC: 109 MMOL/L (ref 95–110)
CHOLEST SERPL-MCNC: 216 MG/DL (ref 120–199)
CHOLEST/HDLC SERPL: 3.7 {RATIO} (ref 2–5)
CO2 SERPL-SCNC: 24 MMOL/L (ref 23–29)
CREAT SERPL-MCNC: 0.9 MG/DL (ref 0.5–1.4)
DIFFERENTIAL METHOD BLD: NORMAL
EOSINOPHIL # BLD AUTO: 0.1 K/UL (ref 0–0.5)
EOSINOPHIL NFR BLD: 1.1 % (ref 0–8)
ERYTHROCYTE [DISTWIDTH] IN BLOOD BY AUTOMATED COUNT: 12.9 % (ref 11.5–14.5)
EST. GFR  (NO RACE VARIABLE): >60 ML/MIN/1.73 M^2
GLUCOSE SERPL-MCNC: 87 MG/DL (ref 70–110)
HCT VFR BLD AUTO: 45.3 % (ref 37–48.5)
HDLC SERPL-MCNC: 59 MG/DL (ref 40–75)
HDLC SERPL: 27.3 % (ref 20–50)
HGB BLD-MCNC: 14.7 G/DL (ref 12–16)
IMM GRANULOCYTES # BLD AUTO: 0.03 K/UL (ref 0–0.04)
IMM GRANULOCYTES NFR BLD AUTO: 0.4 % (ref 0–0.5)
LDLC SERPL CALC-MCNC: 145.8 MG/DL (ref 63–159)
LYMPHOCYTES # BLD AUTO: 2.1 K/UL (ref 1–4.8)
LYMPHOCYTES NFR BLD: 25.8 % (ref 18–48)
MCH RBC QN AUTO: 30.6 PG (ref 27–31)
MCHC RBC AUTO-ENTMCNC: 32.5 G/DL (ref 32–36)
MCV RBC AUTO: 94 FL (ref 82–98)
MONOCYTES # BLD AUTO: 0.6 K/UL (ref 0.3–1)
MONOCYTES NFR BLD: 7.2 % (ref 4–15)
NEUTROPHILS # BLD AUTO: 5.3 K/UL (ref 1.8–7.7)
NEUTROPHILS NFR BLD: 65 % (ref 38–73)
NONHDLC SERPL-MCNC: 157 MG/DL
NRBC BLD-RTO: 0 /100 WBC
PLATELET # BLD AUTO: 172 K/UL (ref 150–450)
PMV BLD AUTO: 12.3 FL (ref 9.2–12.9)
POTASSIUM SERPL-SCNC: 4.8 MMOL/L (ref 3.5–5.1)
PROT SERPL-MCNC: 7.7 G/DL (ref 6–8.4)
RBC # BLD AUTO: 4.8 M/UL (ref 4–5.4)
SODIUM SERPL-SCNC: 143 MMOL/L (ref 136–145)
TRIGL SERPL-MCNC: 56 MG/DL (ref 30–150)
TSH SERPL DL<=0.005 MIU/L-ACNC: 1.25 UIU/ML (ref 0.4–4)
WBC # BLD AUTO: 8.21 K/UL (ref 3.9–12.7)

## 2024-05-31 PROCEDURE — 99999 PR PBB SHADOW E&M-EST. PATIENT-LVL IV: CPT | Mod: PBBFAC,,, | Performed by: FAMILY MEDICINE

## 2024-05-31 PROCEDURE — 99214 OFFICE O/P EST MOD 30 MIN: CPT | Mod: PBBFAC | Performed by: FAMILY MEDICINE

## 2024-05-31 PROCEDURE — 99396 PREV VISIT EST AGE 40-64: CPT | Mod: S$PBB,,, | Performed by: FAMILY MEDICINE

## 2024-05-31 PROCEDURE — 80061 LIPID PANEL: CPT | Performed by: FAMILY MEDICINE

## 2024-05-31 PROCEDURE — 85025 COMPLETE CBC W/AUTO DIFF WBC: CPT | Performed by: FAMILY MEDICINE

## 2024-05-31 PROCEDURE — 36415 COLL VENOUS BLD VENIPUNCTURE: CPT | Performed by: FAMILY MEDICINE

## 2024-05-31 PROCEDURE — 80053 COMPREHEN METABOLIC PANEL: CPT | Performed by: FAMILY MEDICINE

## 2024-05-31 PROCEDURE — 84443 ASSAY THYROID STIM HORMONE: CPT | Performed by: FAMILY MEDICINE

## 2024-05-31 NOTE — PROGRESS NOTES
Subjective:       Patient ID: Alysa Jones is a 43 y.o. female.    Chief Complaint: Annual Exam, Mass (In neck), and Abdominal Pain    Mass  Associated symptoms include abdominal pain. Pertinent negatives include no chest pain, chills, coughing, fever or rash.   Abdominal Pain  Pertinent negatives include no fever.     History of Present Illness  The patient presents for evaluation of multiple medical concerns.    The patient is worried about lymphoma due to persistent edema in her arms, a symptom that has been present since her COVID-19 vaccination. Initially, the edema was intermittent and associated with her menstrual cycle, but it has since become constant. She also experiences swelling post-alcohol consumption. A recent mammogram did not reveal any abnormalities. The tenderness is more pronounced than before. She also reports a palpable mass on the left side of her arm, which has been present since Christmas.  She has requested an ultrasound and blood work.    The patient's heart rate was recorded at 69 upon arrival. She has been self-medicating with magnesium glycinate 240 mg at night to aid sleep. She is uncertain if the cause is anxiety, as she is not currently medicated for it. Her heart rate ranges from 85 to 125 during physical activities such as lifting and walking. She occasionally experiences nocturnal chest pounding, which disrupts her sleep. She sleeps on her side and does not take any sleep aids.    The patient's cholesterol has been consistently high since she was 8 years old. . She consulted a [facility] doctor and has lost 30 pounds. She is gradually weaning herself off Ozempic.   Both of her parents have atrial fibrillation. Her mother had a cardiac ablation a year ago and still has not seen her doctor.      All of your core healthy metrics are met.      Social History     Social History Narrative    She does not exercise regularly.       Family History   Problem Relation Name Age of Onset     Hyperlipidemia Mother      Hypertension Mother      Breast cancer Maternal Aunt  40    Cancer Maternal Aunt      Diabetes Maternal Grandfather      Heart disease Maternal Grandfather      Breast cancer Paternal Grandmother  40    Cancer Paternal Grandmother         Current Outpatient Medications:     butalbital-acetaminophen-caffeine -40 mg (FIORICET) -40 mg per tablet, Take 1 tablet by mouth every 4 (four) hours as needed for Pain., Disp: 90 tablet, Rfl: 0    cyclobenzaprine (FLEXERIL) 10 MG tablet, , Disp: , Rfl:     esomeprazole (NEXIUM) 40 MG capsule, TAKE 1 CAPSULE(40 MG) BY MOUTH BEFORE BREAKFAST, Disp: 30 capsule, Rfl: 5    hydrocortisone 2.5 % cream, Apply topically 2 (two) times daily as needed., Disp: , Rfl:     ibuprofen (ADVIL,MOTRIN) 200 MG tablet, Take 200 mg by mouth every 6 (six) hours as needed.  , Disp: , Rfl:     norethindrone (MICRONOR) 0.35 mg tablet, TAKE 1 TABLET(0.35 MG) BY MOUTH EVERY DAY AT THE SAME TIME, Disp: 90 tablet, Rfl: 1    ondansetron (ZOFRAN-ODT) 4 MG TbDL, DISSOLVE 1 TABLET(4 MG) ON THE TONGUE EVERY 8 HOURS AS NEEDED, Disp: 40 tablet, Rfl: 1    semaglutide (OZEMPIC) 0.25 mg or 0.5 mg(2 mg/1.5 mL) pen injector, Inject 0.5 mg into the skin every 7 days., Disp: , Rfl:     sulfacetamide sodium-sulfur (SULFACLEANSE 8-4) 8-4 % Susp, Wash face qhs, Disp: 473 mL, Rfl: 3    valACYclovir (VALTREX) 1000 MG tablet, Take 2 tablets (2,000 mg total) by mouth every 12 (twelve) hours. For 1-2 days at first sign of flare, Disp: 12 tablet, Rfl: 5    pantoprazole (PROTONIX) 40 MG tablet, Take 1 tablet (40 mg total) by mouth once daily., Disp: 90 tablet, Rfl: 1    propranoloL (INDERAL) 10 MG tablet, Take 1 tablet (10 mg total) by mouth 2 (two) times daily as needed (anxiety/palpitations)., Disp: 60 tablet, Rfl: 11    Current Facility-Administered Medications:     polidocanoL 1 % (20 mg/2 mL) injection Soln 0.2 mL, 0.2 mL, Intravenous, 1 time in Clinic/HOD, Rohit Mcfadden MD     "polidocanoL 1 % (20 mg/2 mL) injection Soln 0.2 mL, 0.2 mL, Intravenous, 1 time in Clinic/HOD, Rohit Mcfadden MD    Review of Systems   Constitutional:  Negative for chills and fever.   Respiratory:  Negative for cough and shortness of breath.    Cardiovascular:  Negative for chest pain.   Gastrointestinal:  Positive for abdominal pain.   Skin:  Negative for rash.   Neurological:  Negative for dizziness.       Objective:   /71 (BP Location: Left arm, Patient Position: Sitting)   Pulse 71   Ht 5' 8" (1.727 m)   SpO2 99%   BMI 26.00 kg/m²      Physical Exam  Constitutional:       General: She is not in acute distress.     Appearance: She is well-developed. She is not diaphoretic.   HENT:      Head: Normocephalic and atraumatic.      Nose: Nose normal.   Eyes:      General:         Right eye: No discharge.         Left eye: No discharge.      Conjunctiva/sclera: Conjunctivae normal.      Pupils: Pupils are equal, round, and reactive to light.   Neck:      Thyroid: No thyromegaly.   Cardiovascular:      Rate and Rhythm: Normal rate and regular rhythm.      Heart sounds: No murmur heard.  Pulmonary:      Effort: Pulmonary effort is normal. No respiratory distress.      Breath sounds: Normal breath sounds.   Abdominal:      General: There is no distension.      Palpations: Abdomen is soft.   Skin:     Findings: No rash.          Neurological:      Mental Status: She is alert and oriented to person, place, and time.   Psychiatric:         Behavior: Behavior normal.         Physical Exam      @resultssec@  Assessment & Plan   Assessment & Plan  1. Tachycardia.  An EKG and Holter monitor have been ordered. Suspect anxiety    2. Hyperlipidemia.  A prescription for Lipitor 10 mg has been issued.    3. Mass in distal left upper arm.  Will check U/S    Problem List Items Addressed This Visit    None  Visit Diagnoses       Routine health maintenance    -  Primary    Relevant Orders    Comprehensive Metabolic " Panel (Completed)    Lipid Panel (Completed)    Tachycardia        Relevant Orders    TSH (Completed)    CBC Auto Differential (Completed)    SCHEDULED EKG 12-LEAD (to Muse) (Completed)    Holter monitor - 48 hour    Left arm swelling        Relevant Orders    US Soft Tissue Upper Extremity, Left (Completed)              Immunizations Administered on Date of Encounter - 5/31/2024       No immunizations on file.             No follow-ups on file.      Disclaimer:  This note may have been prepared using voice recognition software, it may have not been extensively proofed, as such there could be errors within the text such as sound alike errors.

## 2024-06-03 ENCOUNTER — PATIENT MESSAGE (OUTPATIENT)
Dept: INTERNAL MEDICINE | Facility: CLINIC | Age: 44
End: 2024-06-03
Payer: OTHER GOVERNMENT

## 2024-06-03 ENCOUNTER — HOSPITAL ENCOUNTER (OUTPATIENT)
Dept: CARDIOLOGY | Facility: OTHER | Age: 44
Discharge: HOME | End: 2024-06-03
Attending: FAMILY MEDICINE
Payer: OTHER GOVERNMENT

## 2024-06-03 DIAGNOSIS — R00.0 TACHYCARDIA: ICD-10-CM

## 2024-06-03 LAB
OHS QRS DURATION: 84 MS
OHS QTC CALCULATION: 464 MS

## 2024-06-03 PROCEDURE — 93005 ELECTROCARDIOGRAM TRACING: CPT

## 2024-06-03 PROCEDURE — 93010 ELECTROCARDIOGRAM REPORT: CPT | Mod: ,,, | Performed by: INTERNAL MEDICINE

## 2024-06-04 ENCOUNTER — PATIENT MESSAGE (OUTPATIENT)
Dept: INTERNAL MEDICINE | Facility: CLINIC | Age: 44
End: 2024-06-04
Payer: OTHER GOVERNMENT

## 2024-06-04 DIAGNOSIS — R00.0 TACHYCARDIA: Primary | ICD-10-CM

## 2024-06-04 RX ORDER — PROPRANOLOL HYDROCHLORIDE 10 MG/1
10 TABLET ORAL 2 TIMES DAILY PRN
Qty: 60 TABLET | Refills: 11 | Status: SHIPPED | OUTPATIENT
Start: 2024-06-04 | End: 2025-06-04

## 2024-06-05 NOTE — TELEPHONE ENCOUNTER
Contacted pt in regards to scheduling cardiology appt. Scheduled.     Pt's message:  Why do you want me to see cardiology? Is there something else?  Should I take propanol while wearing the Holter monitor? Or should I stop for a few days prior?    Forward to PCP.

## 2024-06-10 ENCOUNTER — PATIENT MESSAGE (OUTPATIENT)
Dept: INTERNAL MEDICINE | Facility: CLINIC | Age: 44
End: 2024-06-10
Payer: OTHER GOVERNMENT

## 2024-06-10 ENCOUNTER — CLINICAL SUPPORT (OUTPATIENT)
Dept: CARDIOLOGY | Facility: HOSPITAL | Age: 44
End: 2024-06-10
Attending: FAMILY MEDICINE
Payer: OTHER GOVERNMENT

## 2024-06-10 ENCOUNTER — HOSPITAL ENCOUNTER (OUTPATIENT)
Dept: RADIOLOGY | Facility: OTHER | Age: 44
Discharge: HOME | End: 2024-06-10
Attending: FAMILY MEDICINE
Payer: OTHER GOVERNMENT

## 2024-06-10 DIAGNOSIS — M79.89 LEFT ARM SWELLING: ICD-10-CM

## 2024-06-10 DIAGNOSIS — R00.0 TACHYCARDIA: ICD-10-CM

## 2024-06-10 PROCEDURE — 76882 US LMTD JT/FCL EVL NVASC XTR: CPT | Mod: TC,LT

## 2024-06-10 PROCEDURE — 76882 US LMTD JT/FCL EVL NVASC XTR: CPT | Mod: 26,LT,, | Performed by: RADIOLOGY

## 2024-06-10 PROCEDURE — 93227 XTRNL ECG REC<48 HR R&I: CPT | Mod: ,,, | Performed by: INTERNAL MEDICINE

## 2024-06-10 PROCEDURE — 93226 XTRNL ECG REC<48 HR SCAN A/R: CPT

## 2024-06-11 ENCOUNTER — PATIENT MESSAGE (OUTPATIENT)
Dept: INTERNAL MEDICINE | Facility: CLINIC | Age: 44
End: 2024-06-11
Payer: OTHER GOVERNMENT

## 2024-06-12 ENCOUNTER — PATIENT MESSAGE (OUTPATIENT)
Dept: INTERNAL MEDICINE | Facility: CLINIC | Age: 44
End: 2024-06-12
Payer: OTHER GOVERNMENT

## 2024-06-12 RX ORDER — HYDROCORTISONE 25 MG/G
CREAM TOPICAL 2 TIMES DAILY PRN
Qty: 28 G | Refills: 2 | Status: SHIPPED | OUTPATIENT
Start: 2024-06-12

## 2024-06-14 ENCOUNTER — PATIENT MESSAGE (OUTPATIENT)
Dept: INTERNAL MEDICINE | Facility: CLINIC | Age: 44
End: 2024-06-14
Payer: OTHER GOVERNMENT

## 2024-06-14 DIAGNOSIS — R22.32 LOCALIZED SWELLING, MASS, OR LUMP OF LEFT UPPER EXTREMITY: Primary | ICD-10-CM

## 2024-06-14 LAB
OHS CV EVENT MONITOR DAY: 0
OHS CV HOLTER LENGTH DECIMAL HOURS: 48
OHS CV HOLTER LENGTH HOURS: 48
OHS CV HOLTER LENGTH MINUTES: 0
OHS CV HOLTER SINUS AVERAGE HR: 82
OHS CV HOLTER SINUS MAX HR: 136
OHS CV HOLTER SINUS MIN HR: 50

## 2024-07-09 ENCOUNTER — PATIENT MESSAGE (OUTPATIENT)
Dept: DERMATOLOGY | Facility: CLINIC | Age: 44
End: 2024-07-09
Payer: OTHER GOVERNMENT

## 2024-07-15 DIAGNOSIS — L70.0 ACNE VULGARIS: ICD-10-CM

## 2024-07-15 DIAGNOSIS — L71.9 ROSACEA: ICD-10-CM

## 2024-07-15 RX ORDER — SODIUM SULFACETAMIDE AND SULFUR 80; 40 MG/ML; MG/ML
SOLUTION TOPICAL
Qty: 473 ML | Refills: 9 | Status: SHIPPED | OUTPATIENT
Start: 2024-07-15

## 2024-07-15 NOTE — TELEPHONE ENCOUNTER
Please see the attached refill request.    Last seen 05/2024    Assessment / Plan:         Epidermal cyst  Reassurance   Avoid manipulation   Can use retin a nightly on it   Discussed risks of punch bx including scar, recurrence , infection and pt elected to observe lesion for now as not inflamed            Follow up if symptoms worsen or fail to improve.

## 2024-08-05 ENCOUNTER — HOSPITAL ENCOUNTER (OUTPATIENT)
Dept: RADIOLOGY | Facility: OTHER | Age: 44
Discharge: HOME | End: 2024-08-05
Attending: FAMILY MEDICINE
Payer: OTHER GOVERNMENT

## 2024-08-05 DIAGNOSIS — R22.32 LOCALIZED SWELLING, MASS, OR LUMP OF LEFT UPPER EXTREMITY: ICD-10-CM

## 2024-08-05 PROCEDURE — 25500020 PHARM REV CODE 255: Performed by: FAMILY MEDICINE

## 2024-08-05 PROCEDURE — A9585 GADOBUTROL INJECTION: HCPCS | Performed by: FAMILY MEDICINE

## 2024-08-05 PROCEDURE — 73220 MRI UPPR EXTREMITY W/O&W/DYE: CPT | Mod: 26,LT,, | Performed by: RADIOLOGY

## 2024-08-05 PROCEDURE — 73220 MRI UPPR EXTREMITY W/O&W/DYE: CPT | Mod: TC,LT

## 2024-08-05 RX ORDER — GADOBUTROL 604.72 MG/ML
7 INJECTION INTRAVENOUS
Status: COMPLETED | OUTPATIENT
Start: 2024-08-05 | End: 2024-08-05

## 2024-08-05 RX ADMIN — GADOBUTROL 7 ML: 604.72 INJECTION INTRAVENOUS at 01:08

## 2024-08-12 ENCOUNTER — OFFICE VISIT (OUTPATIENT)
Dept: DERMATOLOGY | Facility: CLINIC | Age: 44
End: 2024-08-12
Payer: OTHER GOVERNMENT

## 2024-08-12 DIAGNOSIS — D48.5 NEOPLASM OF UNCERTAIN BEHAVIOR OF SKIN: Primary | ICD-10-CM

## 2024-08-12 PROCEDURE — 11104 PUNCH BX SKIN SINGLE LESION: CPT | Mod: PBBFAC,PO | Performed by: DERMATOLOGY

## 2024-08-12 PROCEDURE — 99499 UNLISTED E&M SERVICE: CPT | Mod: S$PBB,,, | Performed by: DERMATOLOGY

## 2024-08-12 PROCEDURE — 99213 OFFICE O/P EST LOW 20 MIN: CPT | Mod: PBBFAC,PO | Performed by: DERMATOLOGY

## 2024-08-12 PROCEDURE — 11104 PUNCH BX SKIN SINGLE LESION: CPT | Mod: S$PBB,,, | Performed by: DERMATOLOGY

## 2024-08-12 PROCEDURE — 99999 PR PBB SHADOW E&M-EST. PATIENT-LVL III: CPT | Mod: PBBFAC,,, | Performed by: DERMATOLOGY

## 2024-08-12 PROCEDURE — 88304 TISSUE EXAM BY PATHOLOGIST: CPT | Performed by: PATHOLOGY

## 2024-08-12 NOTE — PROGRESS NOTES
Subjective:      Patient ID:  Alysa Jones is a 43 y.o. female who presents for   Chief Complaint   Patient presents with    Cyst     Lower back      Pt here for punch bx of a lesion on the lower back . Fills and becomes tender. Getting larger. No tx.     Cyst      Review of Systems   Skin:  Negative for tendency to form keloidal scars.   Hematologic/Lymphatic: Does not bruise/bleed easily.       Objective:   Physical Exam   Skin:   Areas Examined (abnormalities noted in diagram):   Back Inspection Performed                  Diagram Legend     Erythematous scaling macule/papule c/w actinic keratosis       Vascular papule c/w angioma      Pigmented verrucoid papule/plaque c/w seborrheic keratosis      Yellow umbilicated papule c/w sebaceous hyperplasia      Irregularly shaped tan macule c/w lentigo     1-2 mm smooth white papules consistent with Milia      Movable subcutaneous cyst with punctum c/w epidermal inclusion cyst      Subcutaneous movable cyst c/w pilar cyst      Firm pink to brown papule c/w dermatofibroma      Pedunculated fleshy papule(s) c/w skin tag(s)      Evenly pigmented macule c/w junctional nevus     Mildly variegated pigmented, slightly irregular-bordered macule c/w mildly atypical nevus      Flesh colored to evenly pigmented papule c/w intradermal nevus       Pink pearly papule/plaque c/w basal cell carcinoma      Erythematous hyperkeratotic cursted plaque c/w SCC      Surgical scar with no sign of skin cancer recurrence      Open and closed comedones      Inflammatory papules and pustules      Verrucoid papule consistent consistent with wart     Erythematous eczematous patches and plaques     Dystrophic onycholytic nail with subungual debris c/w onychomycosis     Umbilicated papule    Erythematous-base heme-crusted tan verrucoid plaque consistent with inflamed seborrheic keratosis     Erythematous Silvery Scaling Plaque c/w Psoriasis     See annotation      Assessment / Plan:       Pathology Orders:       Normal Orders This Visit    Specimen to Pathology, Dermatology     Questions:    Procedure Type: Dermatology and skin neoplasms    Number of Specimens: 1    ------------------------: -------------------------    Spec 1 Procedure: Biopsy    Spec 1 Clinical Impression: r/o cyst    Spec 1 Source: right lower back    Release to patient:           Neoplasm of uncertain behavior of skin  Punch biopsy procedure note:  Punch biopsy performed after verbal consent obtained. Area marked and prepped with alcohol. Approximately 1cc of 1% lidocaine with epinephrine injected. 6  mm disposable punch used to remove lesion. Hemostasis obtained and biopsy site closed with 1 - 2 Prolene sutures. Wound care instructions reviewed with patient and handout given.    -     Specimen to Pathology, Dermatology             Follow up in about 2 weeks (around 8/26/2024).

## 2024-08-14 LAB
FINAL PATHOLOGIC DIAGNOSIS: NORMAL
GROSS: NORMAL
Lab: NORMAL
MICROSCOPIC EXAM: NORMAL

## 2024-08-14 NOTE — PROGRESS NOTES
Alysa, your pathology report indicates a benign, non cancerous lesion. No further treatment is needed at this time.  Thank you for allowing me to care for you and take care, Dr. Cowan      Skin, right lower back, punch biopsy:  -KERATINOUS CYST (FOLLICULAR CYST, INFUNDIBULAR TYPE)    This lesion is benign.

## 2024-08-23 ENCOUNTER — PATIENT MESSAGE (OUTPATIENT)
Dept: INTERNAL MEDICINE | Facility: CLINIC | Age: 44
End: 2024-08-23
Payer: OTHER GOVERNMENT

## 2024-08-26 ENCOUNTER — CLINICAL SUPPORT (OUTPATIENT)
Dept: DERMATOLOGY | Facility: CLINIC | Age: 44
End: 2024-08-26
Payer: OTHER GOVERNMENT

## 2024-08-26 ENCOUNTER — OFFICE VISIT (OUTPATIENT)
Dept: CARDIOLOGY | Facility: CLINIC | Age: 44
End: 2024-08-26
Payer: OTHER GOVERNMENT

## 2024-08-26 VITALS
HEIGHT: 68 IN | HEART RATE: 82 BPM | BODY MASS INDEX: 22.86 KG/M2 | WEIGHT: 150.81 LBS | RESPIRATION RATE: 15 BRPM | SYSTOLIC BLOOD PRESSURE: 110 MMHG | DIASTOLIC BLOOD PRESSURE: 66 MMHG | OXYGEN SATURATION: 99 %

## 2024-08-26 DIAGNOSIS — R00.2 PALPITATIONS: ICD-10-CM

## 2024-08-26 DIAGNOSIS — R06.02 SOB (SHORTNESS OF BREATH): Primary | ICD-10-CM

## 2024-08-26 DIAGNOSIS — Z82.49 FAMILY HISTORY OF ATRIAL FIBRILLATION: ICD-10-CM

## 2024-08-26 DIAGNOSIS — Z48.02 VISIT FOR SUTURE REMOVAL: Primary | ICD-10-CM

## 2024-08-26 DIAGNOSIS — R00.0 TACHYCARDIA: ICD-10-CM

## 2024-08-26 DIAGNOSIS — R07.89 CHEST TIGHTNESS: ICD-10-CM

## 2024-08-26 PROCEDURE — 99203 OFFICE O/P NEW LOW 30 MIN: CPT | Mod: S$PBB,,, | Performed by: INTERNAL MEDICINE

## 2024-08-26 PROCEDURE — 99211 OFF/OP EST MAY X REQ PHY/QHP: CPT | Mod: PBBFAC,27,PO

## 2024-08-26 PROCEDURE — 99215 OFFICE O/P EST HI 40 MIN: CPT | Mod: PBBFAC,PO | Performed by: INTERNAL MEDICINE

## 2024-08-26 PROCEDURE — 99999 PR PBB SHADOW E&M-EST. PATIENT-LVL V: CPT | Mod: PBBFAC,,, | Performed by: INTERNAL MEDICINE

## 2024-08-26 PROCEDURE — 99999 PR PBB SHADOW E&M-EST. PATIENT-LVL I: CPT | Mod: PBBFAC,,,

## 2024-08-26 PROCEDURE — 99024 POSTOP FOLLOW-UP VISIT: CPT | Mod: ,,, | Performed by: DERMATOLOGY

## 2024-08-26 NOTE — PROGRESS NOTES
Subjective:      Patient ID:  Alysa Jones is a 43 y.o. female who presents for No chief complaint on file.    Suture Removal note:  CC: 43 y.o. female patient is here for suture removal.         HPI: Patient is s/p excision of r/o cyst from the right lower back on 08/12/2024.  Patient reports no problems.    WOUND PE:  Sutures intact.  Wound healing well.  Good approximation of skin edges.  No signs or symptoms of infection.    IMPRESSION:   - margins clear.    PLAN:  Sutures removed today.  Continue wound care.    RTC: In prn months.       Review of Systems    Objective:   Physical Exam     Diagram Legend     Erythematous scaling macule/papule c/w actinic keratosis       Vascular papule c/w angioma      Pigmented verrucoid papule/plaque c/w seborrheic keratosis      Yellow umbilicated papule c/w sebaceous hyperplasia      Irregularly shaped tan macule c/w lentigo     1-2 mm smooth white papules consistent with Milia      Movable subcutaneous cyst with punctum c/w epidermal inclusion cyst      Subcutaneous movable cyst c/w pilar cyst      Firm pink to brown papule c/w dermatofibroma      Pedunculated fleshy papule(s) c/w skin tag(s)      Evenly pigmented macule c/w junctional nevus     Mildly variegated pigmented, slightly irregular-bordered macule c/w mildly atypical nevus      Flesh colored to evenly pigmented papule c/w intradermal nevus       Pink pearly papule/plaque c/w basal cell carcinoma      Erythematous hyperkeratotic cursted plaque c/w SCC      Surgical scar with no sign of skin cancer recurrence      Open and closed comedones      Inflammatory papules and pustules      Verrucoid papule consistent consistent with wart     Erythematous eczematous patches and plaques     Dystrophic onycholytic nail with subungual debris c/w onychomycosis     Umbilicated papule    Erythematous-base heme-crusted tan verrucoid plaque consistent with inflamed seborrheic keratosis     Erythematous Silvery Scaling  Plaque c/w Psoriasis     See annotation      Assessment / Plan:        There are no diagnoses linked to this encounter.         No follow-ups on file.

## 2024-08-26 NOTE — PROGRESS NOTES
CARDIOLOGY CLINIC VISIT        HISTORY OF PRESENT ILLNESS:     2024: Alysa Jones presents for evaluation of shortness of breath.  Over the past year so she has noted episodes that occur primarily at rest where she has to have a deep breath.  Describes a chest tightness that is midsternal.  Episodes have progressed over time to now daily.  Wax and wane.  She feels her heart racing.  She exercises regularly.  Rowing.  Weightlifting.  Recently has had some friends who have passed away secondary to cardiac issues.  Has had some anxiety as of late.  She is on propanolol for both palpitations and anxiety.  Recent Holter showed sinus rhythm.  Rare ectopy.  EKG shows sinus rhythm.  Rightward axis.    CARDIOVASCULAR HISTORY:     None    PAST MEDICAL HISTORY:     Past Medical History:   Diagnosis Date    Abnormal Pap smear     moderate dysplasia    Deep vein thrombosis     GERD (gastroesophageal reflux disease)     HPV (human papilloma virus) infection     Iliac DVT (deep venous thrombosis)     right, leg    Incidental pulmonary nodule     Migraine     Scoliosis     Tobacco use disorder     Varicose veins with inflammation     Venous reflux        PAST SURGICAL HISTORY:     Past Surgical History:   Procedure Laterality Date     SECTION      2    left fibula ORIF      VARICOSE VEIN SURGERY         ALLERGIES AND MEDICATION:     Review of patient's allergies indicates:   Allergen Reactions    Adhesive Rash     Plastic tape    Hydrocodone Nausea And Vomiting        Medication List            Accurate as of 2024 12:09 PM. If you have any questions, ask your nurse or doctor.                CONTINUE taking these medications      butalbital-acetaminophen-caffeine -40 mg -40 mg per tablet  Commonly known as: FIORICET  Take 1 tablet by mouth every 4 (four) hours as needed for Pain.     cyclobenzaprine 10 MG tablet  Commonly known as: FLEXERIL     esomeprazole 40 MG capsule  Commonly known  as: NEXIUM  TAKE 1 CAPSULE(40 MG) BY MOUTH BEFORE BREAKFAST     hydrocortisone 2.5 % cream  Apply topically 2 (two) times daily as needed.     ibuprofen 200 MG tablet  Commonly known as: ADVIL,MOTRIN     norethindrone 0.35 mg tablet  Commonly known as: MICRONOR  TAKE 1 TABLET(0.35 MG) BY MOUTH EVERY DAY AT THE SAME TIME     ondansetron 4 MG Tbdl  Commonly known as: ZOFRAN-ODT  DISSOLVE 1 TABLET(4 MG) ON THE TONGUE EVERY 8 HOURS AS NEEDED     pantoprazole 40 MG tablet  Commonly known as: PROTONIX  Take 1 tablet (40 mg total) by mouth once daily.     propranoloL 10 MG tablet  Commonly known as: INDERAL  Take 1 tablet (10 mg total) by mouth 2 (two) times daily as needed (anxiety/palpitations).     sulfacetamide sodium-sulfur 8-4 % Susp  Commonly known as: SULFACLEANSE 8-4  Wash face qhs     valACYclovir 1000 MG tablet  Commonly known as: VALTREX  Take 2 tablets (2,000 mg total) by mouth every 12 (twelve) hours. For 1-2 days at first sign of flare              SOCIAL HISTORY:     Social History     Socioeconomic History    Marital status:      Spouse name: Onel    Number of children: 2   Occupational History    Occupation: radiology tech     Employer: [employer]   Tobacco Use    Smoking status: Former     Current packs/day: 0.25     Types: Cigarettes    Smokeless tobacco: Never    Tobacco comments:     Pt started smoking again.    Substance and Sexual Activity    Alcohol use: Yes     Comment: occasional    Drug use: No    Sexual activity: Yes     Partners: Male   Social History Narrative    She does not exercise regularly.     Social Determinants of Health     Financial Resource Strain: Low Risk  (5/22/2024)    Overall Financial Resource Strain (CARDIA)     Difficulty of Paying Living Expenses: Not hard at all   Food Insecurity: No Food Insecurity (5/22/2024)    Hunger Vital Sign     Worried About Running Out of Food in the Last Year: Never true     Ran Out of Food in the Last Year: Never true    Transportation Needs: No Transportation Needs (5/6/2024)    PRAPARE - Transportation     Lack of Transportation (Medical): No     Lack of Transportation (Non-Medical): No   Physical Activity: Sufficiently Active (5/22/2024)    Exercise Vital Sign     Days of Exercise per Week: 4 days     Minutes of Exercise per Session: 60 min   Stress: Stress Concern Present (5/22/2024)    [employer] - Occupational Stress Questionnaire     Feeling of Stress : Rather much   Housing Stability: Unknown (5/22/2024)    Housing Stability Vital Sign     Unable to Pay for Housing in the Last Year: No       FAMILY HISTORY:     Family History   Problem Relation Name Age of Onset    Hyperlipidemia Mother      Hypertension Mother      Breast cancer Maternal Aunt  40    Cancer Maternal Aunt      Diabetes Maternal Grandfather      Heart disease Maternal Grandfather      Breast cancer Paternal Grandmother  40    Cancer Paternal Grandmother         REVIEW OF SYSTEMS:   Review of Systems   Constitutional:  Negative for chills, diaphoresis, fever, malaise/fatigue and weight loss.   HENT:  Negative for congestion, hearing loss, sinus pain, sore throat and tinnitus.    Eyes:  Negative for blurred vision, double vision, photophobia and pain.   Respiratory:  Positive for shortness of breath. Negative for cough, hemoptysis, sputum production, wheezing and stridor.    Cardiovascular:  Positive for chest pain and palpitations. Negative for orthopnea, claudication, leg swelling and PND.   Gastrointestinal:  Negative for abdominal pain, blood in stool, heartburn, melena, nausea and vomiting.   Musculoskeletal:  Negative for back pain, falls, joint pain, myalgias and neck pain.   Neurological:  Negative for dizziness, tingling, tremors, sensory change, speech change, focal weakness, seizures, loss of consciousness, weakness and headaches.   Endo/Heme/Allergies:  Does not bruise/bleed easily.   Psychiatric/Behavioral:  Negative  "for depression, memory loss and substance abuse. The patient is not nervous/anxious.        PHYSICAL EXAM:     Vitals:    08/26/24 1130   BP: 110/66   Pulse: 82   Resp: 15    Body mass index is 22.93 kg/m².  Weight: 68.4 kg (150 lb 12.7 oz)   Height: 5' 8" (172.7 cm)     Physical Exam  Vitals reviewed.   Constitutional:       General: She is not in acute distress.     Appearance: Normal appearance. She is well-developed. She is not diaphoretic.   HENT:      Head: Normocephalic.   Neck:      Vascular: No carotid bruit or JVD.   Cardiovascular:      Rate and Rhythm: Normal rate and regular rhythm.      Pulses: Normal pulses.      Heart sounds: Normal heart sounds.   Pulmonary:      Effort: Pulmonary effort is normal.      Breath sounds: Normal breath sounds.   Abdominal:      General: Bowel sounds are normal.      Palpations: Abdomen is soft.      Tenderness: There is no abdominal tenderness.   Skin:     General: Skin is warm and dry.   Neurological:      Mental Status: She is alert and oriented to person, place, and time.   Psychiatric:         Speech: Speech normal.         Behavior: Behavior normal.         Thought Content: Thought content normal.         DATA:   EKG: (personally reviewed tracing)    Results for orders placed or performed during the hospital encounter of 06/03/24   SCHEDULED EKG 12-LEAD (to Muse)    Collection Time: 06/03/24  8:02 AM   Result Value Ref Range    QRS Duration 84 ms    OHS QTC Calculation 464 ms    Narrative    Test Reason : R00.0,    Vent. Rate : 081 BPM     Atrial Rate : 081 BPM     P-R Int : 130 ms          QRS Dur : 084 ms      QT Int : 400 ms       P-R-T Axes : 079 091 079 degrees     QTc Int : 464 ms    Normal sinus rhythm  Rightward axis  Borderline Abnormal ECG    Confirmed by Zainab AREVALO, Saida ([id number]) on 6/3/2024 4:20:29 PM    Referred By: LENORA CALDERON           Confirmed By:Saida Lamb MD        Laboratory:  CBC:  Recent Labs   Lab 02/23/23  1736 07/12/23  1130 " 05/31/24  1423   WBC 7.78 7.53 8.21   Hemoglobin 12.5 13.2 14.7   Hematocrit 39.4 41.5 45.3   Platelets 166 191 172       CHEMISTRIES:  Recent Labs   Lab 02/23/23  1736 05/31/24  1423   Glucose 100 87   Sodium 139 143   Potassium 4.3 4.8   BUN 16 12   Creatinine 0.8 0.9   Calcium 9.8 10.3       CARDIAC BIOMARKERS:        COAGS:  Recent Labs   Lab 07/14/23  1101   INR 1.0       LIPIDS/LFTS:  Recent Labs   Lab 02/23/23  1736 05/31/24  1423   Cholesterol 270 H 216 H   Triglycerides 127 56   HDL 67 59   LDL Cholesterol 177.6 H 145.8   Non-HDL Cholesterol 203 157   AST 24 15   ALT 39 20       Hemoglobin A1C   Date Value Ref Range Status   02/23/2023 5.1 4.0 - 5.6 % Final     Comment:     ADA Screening Guidelines:  5.7-6.4%  Consistent with prediabetes  >or=6.5%  Consistent with diabetes    High levels of fetal hemoglobin interfere with the HbA1C  assay. Heterozygous hemoglobin variants (HbS, HgC, etc)do  not significantly interfere with this assay.   However, presence of multiple variants may affect accuracy.          The 10-year ASCVD risk score (Marta TAPIA, et al., 2019) is: 0.5%    Values used to calculate the score:      Age: 43 years      Sex: Female      Is Non- : No      Diabetic: No      Tobacco smoker: No      Systolic Blood Pressure: 110 mmHg      Is BP treated: No      HDL Cholesterol: 59 mg/dL      Total Cholesterol: 216 mg/dL      Cardiovascular Testing:    No echocardiogram results found for the past 12 months     Holter monitor - 48 hour    Result Date: 6/14/2024    The predominant rhythm is sinus.    Sinus rhythm  Normal heart rate behavior  Very rare ectopy            ASSESSMENT:     Shortness of breath  Chest tightness  Tachycardia  Palpitations  Family history of atrial fibrillation    PLAN:     Shortness of breath/chest tightness:  2D echocardiogram to assess structure and function.  Exercise EKG.  Tachycardia/palpitations:  Normal TSH.  Holter showed sinus rhythm.  Exercise EKG  to see if any symptoms can be elicited.  Return to clinic 1 month.           Cullen Pack MD, MPH, FACC, [facility]

## 2024-09-09 ENCOUNTER — HOSPITAL ENCOUNTER (OUTPATIENT)
Dept: CARDIOLOGY | Facility: HOSPITAL | Age: 44
Discharge: HOME | End: 2024-09-09
Attending: INTERNAL MEDICINE
Payer: OTHER GOVERNMENT

## 2024-09-09 VITALS
SYSTOLIC BLOOD PRESSURE: 110 MMHG | HEART RATE: 70 BPM | DIASTOLIC BLOOD PRESSURE: 66 MMHG | HEIGHT: 68 IN | BODY MASS INDEX: 22.73 KG/M2 | WEIGHT: 150 LBS

## 2024-09-09 VITALS — WEIGHT: 150 LBS | BODY MASS INDEX: 22.73 KG/M2 | HEIGHT: 68 IN

## 2024-09-09 DIAGNOSIS — R00.2 PALPITATIONS: ICD-10-CM

## 2024-09-09 DIAGNOSIS — R00.0 TACHYCARDIA: ICD-10-CM

## 2024-09-09 DIAGNOSIS — R07.89 CHEST TIGHTNESS: ICD-10-CM

## 2024-09-09 DIAGNOSIS — R06.02 SOB (SHORTNESS OF BREATH): ICD-10-CM

## 2024-09-09 LAB
ASCENDING AORTA: 3.25 CM
AV INDEX (PROSTH): 0.68
AV MEAN GRADIENT: 5 MMHG
AV PEAK GRADIENT: 8 MMHG
AV VALVE AREA BY VELOCITY RATIO: 2.75 CM²
AV VALVE AREA: 2.67 CM²
AV VELOCITY RATIO: 0.7
BSA FOR ECHO PROCEDURE: 1.81 M2
CV ECHO LV RWT: 0.25 CM
CV STRESS BASE HR: 62 BPM
DIASTOLIC BLOOD PRESSURE: 60 MMHG
DOP CALC AO PEAK VEL: 1.43 M/S
DOP CALC AO VTI: 34.14 CM
DOP CALC LVOT AREA: 3.9 CM2
DOP CALC LVOT DIAMETER: 2.24 CM
DOP CALC LVOT PEAK VEL: 1 M/S
DOP CALC LVOT STROKE VOLUME: 91.3 CM3
DOP CALCLVOT PEAK VEL VTI: 23.18 CM
E WAVE DECELERATION TIME: 197.38 MSEC
E/A RATIO: 2
E/E' RATIO: 5.92 M/S
ECHO LV POSTERIOR WALL: 0.59 CM (ref 0.6–1.1)
FRACTIONAL SHORTENING: 32 % (ref 28–44)
INTERVENTRICULAR SEPTUM: 0.65 CM (ref 0.6–1.1)
IVRT: 71.36 MSEC
LA MAJOR: 4.24 CM
LA MINOR: 4.43 CM
LA WIDTH: 3.56 CM
LEFT ATRIUM SIZE: 2.77 CM
LEFT ATRIUM VOLUME INDEX MOD: 24.4 ML/M2
LEFT ATRIUM VOLUME INDEX: 20.1 ML/M2
LEFT ATRIUM VOLUME MOD: 44.15 CM3
LEFT ATRIUM VOLUME: 36.32 CM3
LEFT INTERNAL DIMENSION IN SYSTOLE: 3.2 CM (ref 2.1–4)
LEFT VENTRICLE DIASTOLIC VOLUME INDEX: 57.44 ML/M2
LEFT VENTRICLE DIASTOLIC VOLUME: 103.97 ML
LEFT VENTRICLE MASS INDEX: 49 G/M2
LEFT VENTRICLE SYSTOLIC VOLUME INDEX: 22.6 ML/M2
LEFT VENTRICLE SYSTOLIC VOLUME: 40.99 ML
LEFT VENTRICULAR INTERNAL DIMENSION IN DIASTOLE: 4.7 CM (ref 3.5–6)
LEFT VENTRICULAR MASS: 88.59 G
LV LATERAL E/E' RATIO: 5.29 M/S
LV SEPTAL E/E' RATIO: 6.73 M/S
MV A" WAVE DURATION": 17.13 MSEC
MV PEAK A VEL: 0.37 M/S
MV PEAK E VEL: 0.74 M/S
MV STENOSIS PRESSURE HALF TIME: 57.24 MS
MV VALVE AREA P 1/2 METHOD: 3.84 CM2
OHS CV CPX 1 MINUTE RECOVERY HEART RATE: 129 BPM
OHS CV CPX 85 PERCENT MAX PREDICTED HEART RATE MALE: 150
OHS CV CPX ESTIMATED METS: 13
OHS CV CPX MAX PREDICTED HEART RATE: 177
OHS CV CPX PATIENT IS FEMALE: 1
OHS CV CPX PATIENT IS MALE: 0
OHS CV CPX PEAK DIASTOLIC BLOOD PRESSURE: 72 MMHG
OHS CV CPX PEAK HEAR RATE: 150 BPM
OHS CV CPX PEAK RATE PRESSURE PRODUCT: NORMAL
OHS CV CPX PEAK SYSTOLIC BLOOD PRESSURE: 136 MMHG
OHS CV CPX PERCENT MAX PREDICTED HEART RATE ACHIEVED: 89
OHS CV CPX RATE PRESSURE PRODUCT PRESENTING: 5952
PISA TR MAX VEL: 2.24 M/S
PULM VEIN S/D RATIO: 0.74
PV PEAK D VEL: 0.54 M/S
PV PEAK S VEL: 0.4 M/S
RA MAJOR: 4.19 CM
RA PRESSURE ESTIMATED: 3 MMHG
RA WIDTH: 3.32 CM
RIGHT VENTRICLE DIASTOLIC BASEL DIMENSION: 3.1 CM
RV TB RVSP: 5 MMHG
SINUS: 3.66 CM
STJ: 3.14 CM
STRESS ECHO POST EXERCISE DUR MIN: 7 MINUTES
STRESS ECHO POST EXERCISE DUR SEC: 45 SECONDS
SYSTOLIC BLOOD PRESSURE: 96 MMHG
TDI LATERAL: 0.14 M/S
TDI SEPTAL: 0.11 M/S
TDI: 0.13 M/S
TR MAX PG: 20 MMHG
TRICUSPID ANNULAR PLANE SYSTOLIC EXCURSION: 2.76 CM
TV REST PULMONARY ARTERY PRESSURE: 23 MMHG
Z-SCORE OF LEFT VENTRICULAR DIMENSION IN END DIASTOLE: -0.63
Z-SCORE OF LEFT VENTRICULAR DIMENSION IN END SYSTOLE: 0.27

## 2024-09-09 PROCEDURE — 93306 TTE W/DOPPLER COMPLETE: CPT

## 2024-09-09 PROCEDURE — 93306 TTE W/DOPPLER COMPLETE: CPT | Mod: 26,,, | Performed by: INTERNAL MEDICINE

## 2024-09-09 PROCEDURE — 93017 CV STRESS TEST TRACING ONLY: CPT

## 2024-09-09 PROCEDURE — 93016 CV STRESS TEST SUPVJ ONLY: CPT | Mod: ,,, | Performed by: INTERNAL MEDICINE

## 2024-09-09 PROCEDURE — 93018 CV STRESS TEST I&R ONLY: CPT | Mod: ,,, | Performed by: INTERNAL MEDICINE

## 2024-09-24 ENCOUNTER — PATIENT MESSAGE (OUTPATIENT)
Dept: INTERNAL MEDICINE | Facility: CLINIC | Age: 44
End: 2024-09-24
Payer: OTHER GOVERNMENT

## 2024-09-24 ENCOUNTER — PATIENT MESSAGE (OUTPATIENT)
Dept: OPTOMETRY | Facility: CLINIC | Age: 44
End: 2024-09-24
Payer: OTHER GOVERNMENT

## 2024-09-24 DIAGNOSIS — H04.203 TEARING EYES: Primary | ICD-10-CM

## 2024-09-24 NOTE — TELEPHONE ENCOUNTER
Left Voicemail for patient in regards to PCP putting in an order for the eye doctor. Advised that I contacted the department in regards to getting patient in sooner than November. Advised pt to be on the lookout for a phone call regarding this.

## 2024-09-26 DIAGNOSIS — K21.9 GASTROESOPHAGEAL REFLUX DISEASE WITHOUT ESOPHAGITIS: ICD-10-CM

## 2024-09-26 RX ORDER — ONDANSETRON 4 MG/1
TABLET, ORALLY DISINTEGRATING ORAL
Qty: 40 TABLET | Refills: 1 | OUTPATIENT
Start: 2024-09-26

## 2024-09-26 RX ORDER — ONDANSETRON 4 MG/1
4 TABLET, ORALLY DISINTEGRATING ORAL EVERY 8 HOURS PRN
Qty: 40 TABLET | Refills: 1 | Status: SHIPPED | OUTPATIENT
Start: 2024-09-26

## 2024-09-26 NOTE — TELEPHONE ENCOUNTER
Refill Encounter    PCP Visits: Recent Visits  Date Type Provider Dept   05/31/24 Office Visit Marshall Sims DO [facility] Medicine   05/22/24 Office Visit Weeks, Marshall FREY DO [facility] Medicine   Showing recent visits within past 360 days and meeting all other requirements  Future Appointments  No visits were found meeting these conditions.  Showing future appointments within next 720 days and meeting all other requirements     Last 3 Blood Pressure:   BP Readings from Last 3 Encounters:   09/09/24 110/66   08/26/24 110/66   05/31/24 103/71     Preferred Pharmacy:   [id number] [place] [address] AT [facility] [address] [address]  Phone: [phone] Fax: [phone]    Requested RX:  Requested Prescriptions     Pending Prescriptions Disp Refills    ondansetron (ZOFRAN-ODT) 4 MG TbDL 40 tablet 1      RX Route: Normal

## 2024-09-26 NOTE — TELEPHONE ENCOUNTER
No care due was identified.  [facility] Embedded Care Due Messages. Reference number: [id number].   9/26/2024 1:35:19 PM CDT

## 2024-09-26 NOTE — TELEPHONE ENCOUNTER
No care due was identified.  [facility] Embedded Care Due Messages. Reference number: [id number].   9/26/2024 1:34:30 PM CDT

## 2024-09-30 DIAGNOSIS — N93.9 ABNORMAL UTERINE BLEEDING: ICD-10-CM

## 2024-09-30 RX ORDER — NORETHINDRONE 0.35 MG/1
1 TABLET ORAL DAILY
Qty: 90 TABLET | Refills: 0 | Status: SHIPPED | OUTPATIENT
Start: 2024-09-30

## 2024-09-30 RX ORDER — NORETHINDRONE 0.35 MG/1
TABLET ORAL
Qty: 84 TABLET | OUTPATIENT
Start: 2024-09-30

## 2024-09-30 NOTE — TELEPHONE ENCOUNTER
Refill Decision Note   Alysa Jones  is requesting a refill authorization.  Brief Assessment and Rationale for Refill:  Quick Discontinue     Medication Therapy Plan:  Receipt confirmed by pharmacy (9/30/2024 12:09 PM CDT)      Comments:     Note composed:3:02 PM 09/30/2024

## 2024-10-03 RX ORDER — ESOMEPRAZOLE MAGNESIUM 40 MG/1
40 CAPSULE, DELAYED RELEASE ORAL
Qty: 90 CAPSULE | Refills: 2 | Status: SHIPPED | OUTPATIENT
Start: 2024-10-03

## 2024-10-03 RX ORDER — MUPIROCIN 20 MG/G
OINTMENT TOPICAL
Qty: 30 G | Refills: 3 | Status: SHIPPED | OUTPATIENT
Start: 2024-10-03

## 2024-10-03 NOTE — TELEPHONE ENCOUNTER
No care due was identified.  [facility] Embedded Care Due Messages. Reference number: [id number].   10/02/2024 7:39:44 PM CDT

## 2024-11-10 ENCOUNTER — PATIENT MESSAGE (OUTPATIENT)
Dept: INTERNAL MEDICINE | Facility: CLINIC | Age: 44
End: 2024-11-10
Payer: OTHER GOVERNMENT

## 2024-11-10 DIAGNOSIS — R59.0 LOCALIZED ENLARGED LYMPH NODES: Primary | ICD-10-CM

## 2024-11-10 DIAGNOSIS — R59.0 CERVICAL LYMPHADENOPATHY: ICD-10-CM

## 2024-11-11 ENCOUNTER — PATIENT MESSAGE (OUTPATIENT)
Dept: OBSTETRICS AND GYNECOLOGY | Facility: CLINIC | Age: 44
End: 2024-11-11
Payer: OTHER GOVERNMENT

## 2024-11-12 NOTE — PROGRESS NOTES
"OBSTETRICS AND GYNECOLOGY    Chief Complaint:  Well Woman Exam     HPI:      Alysa Jones is a 44 y.o.  who presents today for well woman exam.    LMP: No LMP recorded. Specifically, patient denies abnormal vaginal bleeding, abnormal discharge/odor, pelvic pain, or dysuria/hematuria. Ms. Jones is currently sexually active with a single male partner. She is currently using oral progesterone-only contraceptive for contraception. She declines STD screening today. Amenorrhea on the pill. Occasional night sweats. She denies additional issues, problems, or complaints.     Ms. Jones confirms that she wears her seatbelt when riding in the car.     OB History          2    Para   2    Term   2            AB        Living   2         SAB        IAB        Ectopic        Multiple        Live Births   2           Obstetric Comments   History of MRSA, hospitalization x 34days after first delivery.  Term CS x 2.             ROS:     GENERAL: Feeling well overall.   BREASTS: Denies breast skin changes, lumps or nipple discharge.   URINARY: Denies dysuria, hematuria.    Physical Exam:      PHYSICAL EXAM:  /70   Ht 5' 8" (1.727 m)   Wt 69.4 kg (153 lb)   BMI 23.26 kg/m²   Body mass index is 23.26 kg/m².     APPEARANCE:  Well nourished, well developed, in no acute distress.  Able to smile appropriately during our encounter. Makes eye contact. Pleasant.  PSYCH: Appropriate mood and affect.  SKIN:   No acne or hirsutism.  CARDIOVASCULAR:  No edema of peripheral extremities. Well perfused throughout.  RESP:  No accessory muscle use to breathe. Speaking comfortably in complete sentences.   BREASTS:  Symmetrical, with no visible skin lesions or scars, no palpable masses. No nipple discharge or peau d'orange bilaterally. No palpable axillary LAD.  ABDOMEN:  Soft. Nonacute.    PELVIC:  Normal external genitalia without lesions. Normal hair distribution. Adequate perineal body, normal urethral meatus. " Vagina moist and well rugated. Without lesions, without discharge. Cervix 3x4cm, parous. Cervix pink, without ectocervical lesions, discharge or tenderness.  No ectropion. No significant cystocele or rectocele.  Bimanual exam shows uterus to be normal size, regular, mobile and nontender.  Adnexa without masses or tenderness.      Female chaperone present.    Assessment/Plan:     Well Woman Exam  -- Counseled patient regarding healthy diet and regular exercise, daily seat belt use.   -- BP normotensive  -- She denies abuse and feels safe at home.   -- Pap smear:  NILM, HPV(-) 7/2023    -- EMB:  8/2023 proliferative  -- Contraception:  POPs  -- MMG: BiRADS Cat 1, TC 17.65% 5/2024  -- STD screening:  declines   -- Labs, reviewed today:  TSH WNL 5/2024    Follow up in about 1 year (around 11/22/2025) for WWE.    Counseling:     Patient was counseled today on current ASCCP pap guidelines, the recommendation for yearly physical exams, safe driving habits, and breast self awareness. She is to see her PCP for other health maintenance.     Use of the [facility] Patient Portal discussed and encouraged during today's visit.           As of April 1, 2021, the Cures Act has been passed nationally. This new law requires that all doctors progress notes, lab results, pathology reports and radiology reports be released IMMEDIATELY to the patient in the patient portal. That means that the results are released to you at the EXACT same time they are released to me. Therefore, with all of the patients that I have I am not able to reply to each patient exactly when the results come in. So there will be a delay from when you see the results to when I see them and have time to come up with a response to send you. Also I only see these results when I am on the computer at work. So if the results come in over the weekend or after 5 pm of a work day, I will not see them until the next business day. As you can tell, this is a challenge as a  physician to give every patient the quick response they hope for and deserve. So please be patient!   Thanks for your understanding and patience.

## 2024-11-13 ENCOUNTER — PATIENT MESSAGE (OUTPATIENT)
Dept: INTERNAL MEDICINE | Facility: CLINIC | Age: 44
End: 2024-11-13
Payer: OTHER GOVERNMENT

## 2024-11-13 NOTE — TELEPHONE ENCOUNTER
Scheduled pt for CT:    Friday Nov 15, 2024   Appt at 6:45 AM (15 min)          per Dr. Sims instruction.

## 2024-11-14 ENCOUNTER — LAB VISIT (OUTPATIENT)
Dept: LAB | Facility: HOSPITAL | Age: 44
End: 2024-11-14
Attending: FAMILY MEDICINE
Payer: OTHER GOVERNMENT

## 2024-11-14 DIAGNOSIS — R59.0 LOCALIZED ENLARGED LYMPH NODES: ICD-10-CM

## 2024-11-14 LAB
ALBUMIN SERPL BCP-MCNC: 4.1 G/DL (ref 3.5–5.2)
ALP SERPL-CCNC: 39 U/L (ref 40–150)
ALT SERPL W/O P-5'-P-CCNC: 18 U/L (ref 10–44)
ANION GAP SERPL CALC-SCNC: 7 MMOL/L (ref 8–16)
AST SERPL-CCNC: 15 U/L (ref 10–40)
BASOPHILS # BLD AUTO: 0.03 K/UL (ref 0–0.2)
BASOPHILS NFR BLD: 0.5 % (ref 0–1.9)
BILIRUB SERPL-MCNC: 0.5 MG/DL (ref 0.1–1)
BUN SERPL-MCNC: 11 MG/DL (ref 6–20)
CALCIUM SERPL-MCNC: 9.8 MG/DL (ref 8.7–10.5)
CHLORIDE SERPL-SCNC: 105 MMOL/L (ref 95–110)
CO2 SERPL-SCNC: 25 MMOL/L (ref 23–29)
CREAT SERPL-MCNC: 0.8 MG/DL (ref 0.5–1.4)
DIFFERENTIAL METHOD BLD: NORMAL
EOSINOPHIL # BLD AUTO: 0.1 K/UL (ref 0–0.5)
EOSINOPHIL NFR BLD: 1.8 % (ref 0–8)
ERYTHROCYTE [DISTWIDTH] IN BLOOD BY AUTOMATED COUNT: 13.2 % (ref 11.5–14.5)
EST. GFR  (NO RACE VARIABLE): >60 ML/MIN/1.73 M^2
GLUCOSE SERPL-MCNC: 122 MG/DL (ref 70–110)
HCT VFR BLD AUTO: 42.7 % (ref 37–48.5)
HGB BLD-MCNC: 14 G/DL (ref 12–16)
IMM GRANULOCYTES # BLD AUTO: 0.02 K/UL (ref 0–0.04)
IMM GRANULOCYTES NFR BLD AUTO: 0.3 % (ref 0–0.5)
LYMPHOCYTES # BLD AUTO: 1.9 K/UL (ref 1–4.8)
LYMPHOCYTES NFR BLD: 28 % (ref 18–48)
MCH RBC QN AUTO: 30.6 PG (ref 27–31)
MCHC RBC AUTO-ENTMCNC: 32.8 G/DL (ref 32–36)
MCV RBC AUTO: 93 FL (ref 82–98)
MONOCYTES # BLD AUTO: 0.4 K/UL (ref 0.3–1)
MONOCYTES NFR BLD: 5.9 % (ref 4–15)
NEUTROPHILS # BLD AUTO: 4.2 K/UL (ref 1.8–7.7)
NEUTROPHILS NFR BLD: 63.5 % (ref 38–73)
NRBC BLD-RTO: 0 /100 WBC
PLATELET # BLD AUTO: 170 K/UL (ref 150–450)
PMV BLD AUTO: 12.2 FL (ref 9.2–12.9)
POTASSIUM SERPL-SCNC: 4.6 MMOL/L (ref 3.5–5.1)
PROT SERPL-MCNC: 7.2 G/DL (ref 6–8.4)
RBC # BLD AUTO: 4.58 M/UL (ref 4–5.4)
SODIUM SERPL-SCNC: 137 MMOL/L (ref 136–145)
WBC # BLD AUTO: 6.65 K/UL (ref 3.9–12.7)

## 2024-11-14 PROCEDURE — 85025 COMPLETE CBC W/AUTO DIFF WBC: CPT | Performed by: FAMILY MEDICINE

## 2024-11-14 PROCEDURE — 80053 COMPREHEN METABOLIC PANEL: CPT | Performed by: FAMILY MEDICINE

## 2024-11-14 PROCEDURE — 36415 COLL VENOUS BLD VENIPUNCTURE: CPT | Performed by: FAMILY MEDICINE

## 2024-11-15 ENCOUNTER — PATIENT MESSAGE (OUTPATIENT)
Dept: INTERNAL MEDICINE | Facility: CLINIC | Age: 44
End: 2024-11-15
Payer: OTHER GOVERNMENT

## 2024-11-15 ENCOUNTER — HOSPITAL ENCOUNTER (OUTPATIENT)
Dept: RADIOLOGY | Facility: HOSPITAL | Age: 44
Discharge: HOME | End: 2024-11-15
Attending: FAMILY MEDICINE
Payer: OTHER GOVERNMENT

## 2024-11-15 DIAGNOSIS — R59.0 CERVICAL LYMPHADENOPATHY: ICD-10-CM

## 2024-11-15 DIAGNOSIS — E04.2 MULTIPLE THYROID NODULES: Primary | ICD-10-CM

## 2024-11-15 DIAGNOSIS — R59.0 LOCALIZED ENLARGED LYMPH NODES: ICD-10-CM

## 2024-11-15 PROCEDURE — 70491 CT SOFT TISSUE NECK W/DYE: CPT | Mod: 26,,, | Performed by: RADIOLOGY

## 2024-11-15 PROCEDURE — 70491 CT SOFT TISSUE NECK W/DYE: CPT | Mod: TC

## 2024-11-15 PROCEDURE — 25500020 PHARM REV CODE 255: Performed by: FAMILY MEDICINE

## 2024-11-15 RX ADMIN — IOHEXOL 75 ML: 350 INJECTION, SOLUTION INTRAVENOUS at 07:11

## 2024-11-19 ENCOUNTER — TELEPHONE (OUTPATIENT)
Dept: INTERNAL MEDICINE | Facility: CLINIC | Age: 44
End: 2024-11-19
Payer: OTHER GOVERNMENT

## 2024-11-19 NOTE — TELEPHONE ENCOUNTER
Spoke with patient briefly since she was between surgical cases. Asked me to try and schedule US at [facility], if possible then message via [email or web address]. Patient expressed understanding and gratitude for phone call.  Call ended.      Unable to schedule at [facility]. Scheduled 11/22/24 @ [facility] @[id number].    [email or web address] message sent.

## 2024-11-22 ENCOUNTER — OFFICE VISIT (OUTPATIENT)
Dept: OBSTETRICS AND GYNECOLOGY | Facility: CLINIC | Age: 44
End: 2024-11-22
Payer: OTHER GOVERNMENT

## 2024-11-22 ENCOUNTER — HOSPITAL ENCOUNTER (OUTPATIENT)
Dept: RADIOLOGY | Facility: OTHER | Age: 44
Discharge: HOME | End: 2024-11-22
Attending: FAMILY MEDICINE
Payer: OTHER GOVERNMENT

## 2024-11-22 VITALS
DIASTOLIC BLOOD PRESSURE: 70 MMHG | BODY MASS INDEX: 23.19 KG/M2 | WEIGHT: 153 LBS | SYSTOLIC BLOOD PRESSURE: 104 MMHG | HEIGHT: 68 IN

## 2024-11-22 DIAGNOSIS — Z01.419 ENCOUNTER FOR WELL WOMAN EXAM: Primary | ICD-10-CM

## 2024-11-22 DIAGNOSIS — N93.9 ABNORMAL UTERINE BLEEDING: ICD-10-CM

## 2024-11-22 DIAGNOSIS — E04.2 MULTIPLE THYROID NODULES: ICD-10-CM

## 2024-11-22 PROCEDURE — 76536 US EXAM OF HEAD AND NECK: CPT | Mod: TC

## 2024-11-22 PROCEDURE — 99999 PR PBB SHADOW E&M-EST. PATIENT-LVL III: CPT | Mod: PBBFAC,,, | Performed by: STUDENT IN AN ORGANIZED HEALTH CARE EDUCATION/TRAINING PROGRAM

## 2024-11-22 PROCEDURE — 76536 US EXAM OF HEAD AND NECK: CPT | Mod: 26,,, | Performed by: RADIOLOGY

## 2024-11-22 PROCEDURE — 99213 OFFICE O/P EST LOW 20 MIN: CPT | Mod: PBBFAC | Performed by: STUDENT IN AN ORGANIZED HEALTH CARE EDUCATION/TRAINING PROGRAM

## 2024-11-22 RX ORDER — HYDROCORTISONE 25 MG/G
OINTMENT TOPICAL 2 TIMES DAILY PRN
COMMUNITY
Start: 2024-07-15

## 2024-11-22 RX ORDER — NORETHINDRONE 0.35 MG/1
1 TABLET ORAL DAILY
Qty: 90 TABLET | Refills: 3 | Status: SHIPPED | OUTPATIENT
Start: 2024-11-22

## 2024-12-03 ENCOUNTER — HOSPITAL ENCOUNTER (OUTPATIENT)
Dept: RADIOLOGY | Facility: OTHER | Age: 44
Discharge: HOME | End: 2024-12-03
Attending: FAMILY MEDICINE
Payer: OTHER GOVERNMENT

## 2024-12-03 DIAGNOSIS — E04.2 MULTIPLE THYROID NODULES: ICD-10-CM

## 2024-12-03 PROCEDURE — 88173 CYTOPATH EVAL FNA REPORT: CPT | Performed by: STUDENT IN AN ORGANIZED HEALTH CARE EDUCATION/TRAINING PROGRAM

## 2024-12-03 PROCEDURE — 63600175 PHARM REV CODE 636 W HCPCS: Performed by: FAMILY MEDICINE

## 2024-12-03 PROCEDURE — 88177 CYTP FNA EVAL EA ADDL: CPT | Performed by: STUDENT IN AN ORGANIZED HEALTH CARE EDUCATION/TRAINING PROGRAM

## 2024-12-03 PROCEDURE — 10005 FNA BX W/US GDN 1ST LES: CPT

## 2024-12-03 PROCEDURE — 10005 FNA BX W/US GDN 1ST LES: CPT | Mod: ,,, | Performed by: RADIOLOGY

## 2024-12-03 PROCEDURE — 88172 CYTP DX EVAL FNA 1ST EA SITE: CPT | Performed by: STUDENT IN AN ORGANIZED HEALTH CARE EDUCATION/TRAINING PROGRAM

## 2024-12-03 RX ORDER — LIDOCAINE HYDROCHLORIDE 10 MG/ML
5 INJECTION, SOLUTION INFILTRATION; PERINEURAL ONCE
Status: COMPLETED | OUTPATIENT
Start: 2024-12-03 | End: 2024-12-03

## 2024-12-03 RX ADMIN — LIDOCAINE HYDROCHLORIDE 5 ML: 10 INJECTION, SOLUTION EPIDURAL; INFILTRATION; INTRACAUDAL at 09:12

## 2024-12-03 NOTE — PROCEDURES
Radiology Post-Procedure Note    Pre Op Diagnosis: R thyroid nodule  Post Op Diagnosis: Same    Procedure: FNA    Procedure performed by: Dakotah Delgado MD    Written Informed Consent Obtained: Yes  Specimen Removed: YES 3 FNA specimens  Estimated Blood Loss: Minimal    Findings:   FNA of R upper pole thyroid nodule    Patient tolerated procedure well.    Dakotah Delgado MD

## 2024-12-04 LAB
ADEQUACY: NORMAL
FINAL PATHOLOGIC DIAGNOSIS: NORMAL
Lab: NORMAL

## 2024-12-05 ENCOUNTER — PATIENT MESSAGE (OUTPATIENT)
Dept: INTERNAL MEDICINE | Facility: CLINIC | Age: 44
End: 2024-12-05
Payer: OTHER GOVERNMENT

## 2024-12-29 DIAGNOSIS — K21.9 GASTROESOPHAGEAL REFLUX DISEASE WITHOUT ESOPHAGITIS: ICD-10-CM

## 2024-12-29 NOTE — TELEPHONE ENCOUNTER
No care due was identified.  [facility] Embedded Care Due Messages. Reference number: [id number].   12/29/2024 3:56:33 AM CST

## 2024-12-30 RX ORDER — ONDANSETRON 4 MG/1
TABLET, ORALLY DISINTEGRATING ORAL
Qty: 40 TABLET | Refills: 1 | Status: SHIPPED | OUTPATIENT
Start: 2024-12-30

## 2024-12-30 NOTE — TELEPHONE ENCOUNTER
Refill Encounter    PCP Visits: Recent Visits  Date Type Provider Dept   05/31/24 Office Visit WeeksMarshall DO [facility] Internal Medicine   05/22/24 Office Visit Weeks, Marshall FREY DO [facility] Internal Medicine   Showing recent visits within past 360 days and meeting all other requirements  Future Appointments  No visits were found meeting these conditions.  Showing future appointments within next 720 days and meeting all other requirements     Last 3 Blood Pressure:   BP Readings from Last 3 Encounters:   11/22/24 104/70   09/09/24 110/66   08/26/24 110/66     Preferred Pharmacy:   [facility]  [place], [place] - [address] [facility]  [address] [facility] [address]  Phone: [phone] Fax: [phone]    [id number] [address]  [address] [place] [address]  Phone: [phone] Fax: [phone]    [facility] [address] [facility]  [address] [facility] [address]  Phone: [phone] Fax: [phone]    [id number] [address] [address]  Phone: [phone] Fax: [phone]    [id number] [place] [address] AT [facility] [address] [address]  Phone: [phone] Fax: [phone]    Requested RX:  Requested Prescriptions     Pending Prescriptions Disp Refills    ondansetron (ZOFRAN-ODT) 4 MG TbDL [Pharmacy Med Name: ONDANSETRON ODT 4MG TABLETS] 40 tablet 1     Sig: DISSOLVE 1 TABLET(4 MG) ON THE TONGUE EVERY 8 HOURS AS NEEDED      RX Route: Normal

## 2025-01-28 ENCOUNTER — PATIENT MESSAGE (OUTPATIENT)
Dept: INTERNAL MEDICINE | Facility: CLINIC | Age: 45
End: 2025-01-28
Payer: OTHER GOVERNMENT

## 2025-01-28 ENCOUNTER — TELEPHONE (OUTPATIENT)
Dept: INTERNAL MEDICINE | Facility: CLINIC | Age: 45
End: 2025-01-28
Payer: OTHER GOVERNMENT

## 2025-01-28 DIAGNOSIS — J32.9 RECURRENT SINUSITIS: Primary | ICD-10-CM

## 2025-01-28 NOTE — TELEPHONE ENCOUNTER
Requesting referral to ENT for sinuses. LVM that I called to discuss her symptoms.                 From Alysa Jones [facility]      Good morning.  I was treated at urgent care a couple of weeks ago for a sinus infection. The sinus pressure is back in my left sinus. I feel like it may be chronic, since they also mentioned in on the neck CT I had a while back. Can you please put me in a referral for ENT. I think thats why my eye keeps watering.      Thanks so much,   Alysa Jones

## 2025-01-29 ENCOUNTER — OFFICE VISIT (OUTPATIENT)
Dept: OTOLARYNGOLOGY | Facility: CLINIC | Age: 45
End: 2025-01-29
Payer: OTHER GOVERNMENT

## 2025-01-29 VITALS
DIASTOLIC BLOOD PRESSURE: 86 MMHG | HEART RATE: 71 BPM | SYSTOLIC BLOOD PRESSURE: 127 MMHG | BODY MASS INDEX: 24.37 KG/M2 | WEIGHT: 160.25 LBS

## 2025-01-29 DIAGNOSIS — J32.9 RECURRENT SINUSITIS: ICD-10-CM

## 2025-01-29 PROCEDURE — 99999 PR PBB SHADOW E&M-EST. PATIENT-LVL IV: CPT | Mod: PBBFAC,,, | Performed by: OTOLARYNGOLOGY

## 2025-01-29 PROCEDURE — 99244 OFF/OP CNSLTJ NEW/EST MOD 40: CPT | Mod: S$PBB,,, | Performed by: OTOLARYNGOLOGY

## 2025-01-29 PROCEDURE — 99214 OFFICE O/P EST MOD 30 MIN: CPT | Mod: PBBFAC | Performed by: OTOLARYNGOLOGY

## 2025-01-29 NOTE — PROGRESS NOTES
Ms. Jones     Vitals:    01/29/25 1017   BP: 127/86   Pulse: 71       Chief Complaint:  Other Misc (Chronic sinusitis )       HPI:  Mrs. Jones is a 44-year-old white female who presents referred by Dr. Sims for recurrent sinusitis.  She complains of facial pressure pain on her left side mainly in her maxillary area since a sinus infection 2 months ago.  At that time she was treated with antibiotics and steroids as well as steroid nasal sprays.  She states that she has had these symptoms for several years in the past however this was the worst and has persisted.  She also complains of left eye discharge.  Approximately 1 month ago she developed some significant left cervical lymphadenopathy and underwent a CT scan of the neck which did include lower portions of her sinuses.  I have reviewed these scans in their entirety.  They show some mucosal thickening in her left maxillary sinus as well as some air-fluid level consistent with chronic and recurrent sinusitis.  Her maxillary ostia however appears to be open.  This also shows bilateral magui bullosa as well as posterior left-sided septal deviation.    SNOT22- 20 NOSE- 25    Review of Systems:  Constitutional:   weight loss or weight gain: Negative  Allergy/Immunologic:   Negative  Nasal Congestion/Obstruction:   Positive for occasional nasal congestion and postnasal drip  Nosebleeds:   Negative  Sinus infections:   Positive as above  Headache/Facial Pain:   Positive as above  Snoring/GEOVANNY:   Negative  Throat: Infections/Pain:   Negative  Hoarseness/Speech Disturbance:   Negative  Trauma Hx:  Negative    Cardiovascular:  M/I Angina: Negative  Hypertension: Negative  Endocrine:    DM/Steroids: Negative  GI:   Dysphagia/Reflux:  Positive history of GERD on medication  :   GYN Pregnancy: Negative  Renal:   Dialysis: Negative  Lymphatic:   Neck Mass/Lymphadenopathy: Negative  Muscoloskeletal:   Negative  Hematologic:   Bleeding Disorders/Anemia:  Negative  Neurologic:    Cranial/Neuralgia: Negative  Pulmonary:   Asthma/SOB/Cough: Negative  Skin Disorders: Negative    Past Medical/Surgical/Family/Social History:    ENT Surgery: Negative  Occupational Exposure: Negative   Problems: Negative  Cancer: Negative    Past Family History:   Family history of Cancer: Negative    Past Social History:   Tobacco: Nonsmoker   Alcohol:  Twice weekly Social Drinker      Allergies and medications: Reviewed per med card.    Physical Examination:  Ears:   External auditory canals:  Clear   Hearing: Grossly intact   Tympanic Membranes: Clear  Nose:   External: Normal with good valve support   Intranasal:  Moderate posterior left-sided septal deviation, turbinates 1 to 2+, nasal airway clear at this time.  Mouth:   Intraorally: Lips, teeth, and gums: Normal   Oropharynx: Normal   Mucosa: Normal   Tongue: Normal  Throat:      Palate: Normal palate with elevation, Mallampati 1   Tonsils:  Minimal   Posterior Pharynx: Normal  Fiberoptic exam: Not performed  Head/Face:     Inspection: Normal and atraumatic   Palpation/Percussion:  Tender to percussion in the left frontal ethmoid and mainly maxillary sinus regions.   Facial strength: Normal and symmetric   Salivary glands: Normal  Neck: Supple  Thyroid: No masses  Lymphatics: No nodes  Respiratory:   Effort: Normal  Eyes:   Ocular Mobility: Normal   Vision: Grossly intact  Neuro/Psych:   Cranial Nerves: Grossly Intact   Orientation: Normal   Mood/Affect: Normal      Assessment/Plan:  I have discussed my findings with her in detail as well as my recommendations for treatment.  I will place her on compounded rinses containing antibiotics, antifungals, and steroids twice daily for a month as I do with my postop sinus patients.  As her ostia appears open this should be able to get into her maxillary sinuses.  She will contact us after this is completed and let me know how she is doing.  If she is doing well we will transition to  once daily saline sinus rinses with steroid only.  If she is not resolved we will obtain a dedicated CT scan of her sinuses.

## 2025-02-06 ENCOUNTER — PATIENT MESSAGE (OUTPATIENT)
Dept: OTOLARYNGOLOGY | Facility: CLINIC | Age: 45
End: 2025-02-06
Payer: OTHER GOVERNMENT

## 2025-02-26 NOTE — TELEPHONE ENCOUNTER
Received 2nd Faxed RX refill for Propranolol from [facility]. Verified with [facility] that she should have enough  refills to last to approximately  June 2025

## 2025-03-05 ENCOUNTER — PATIENT MESSAGE (OUTPATIENT)
Dept: DERMATOLOGY | Facility: CLINIC | Age: 45
End: 2025-03-05
Payer: OTHER GOVERNMENT

## 2025-03-12 RX ORDER — PROPRANOLOL HYDROCHLORIDE 10 MG/1
10 TABLET ORAL 2 TIMES DAILY PRN
Qty: 60 TABLET | Refills: 11 | Status: SHIPPED | OUTPATIENT
Start: 2025-03-12 | End: 2026-03-12

## 2025-03-12 NOTE — TELEPHONE ENCOUNTER
No care due was identified.  [facility] Embedded Care Due Messages. Reference number: [id number].   3/12/2025 11:20:48 AM CDT

## 2025-03-12 NOTE — TELEPHONE ENCOUNTER
Received 3rd request for EARLY Refill on Propranolol. LOV 05/31/24                  Received 2nd Faxed RX refill for Propranolol from [facility]. Verified with [facility] that she should have enough  refills to last to approximately  June 2025           RX PROGRESS NOTE: Warfarin Anticoagulation Monitoring Initiation Note    Warfarin prescribed for the indication of Valve replacement - mechanical.    Target INR is 2.5 - 3.5.    Anticipated duration of therapy is Indefinite.    Patient continued on prior warfarin therapy.  Dose prior to pharmacist inpatient anticoagulation management service consultation was 7.5 mg 5 x a week and 10 mg q Sunday and Wednesday.    Most Recent Lab Values:  INR (no units)   Date/Time Value   02/11/2020 0637 1.4     HGB (g/dL)   Date/Time Value   02/11/2020 0637 13.7     HCT (%)   Date/Time Value   02/11/2020 0637 41.4     PLT (K/mcL)   Date/Time Value   02/11/2020 0637 132 (L)     AST/SGOT (Units/L)   Date/Time Value   02/10/2020 2022 19     ALT/SGPT (Units/L)   Date/Time Value   02/10/2020 2022 13     Serum creatinine: 0.74 mg/dL 02/11/20 0637  Estimated creatinine clearance: 86.3 mL/min  OB/Gyn status: unknown      Medical History:  Weight: Weight: 71.9 kg (02/11/20 0400)  Age: 75 year old  Patient does not have factors that may warrant deviation from the standard protocol (protocol exception).     Other pertinent medical history includes: HTN, valve replacement    The patient's medication and allergy profile was reviewed for possible drug-allergy, drug-drug, and drug-herbal interactions.    Assessment & Plan:  For anticoagulation therapy, will give Coumadin 10 mg po today; Pt did receive 7.5 mg last pm but had vomiting episode post dose.   Pharmacist inpatient anticoagulation management will review/monitor patient daily and order warfarin dose/regimen based on protocol.    Thank you,    Jeannie Nolan, PHARMD  2/11/2020 10:01 AM

## 2025-03-18 ENCOUNTER — PATIENT MESSAGE (OUTPATIENT)
Dept: INTERNAL MEDICINE | Facility: CLINIC | Age: 45
End: 2025-03-18
Payer: OTHER GOVERNMENT

## 2025-03-18 DIAGNOSIS — M62.838 MUSCLE SPASM: Primary | ICD-10-CM

## 2025-03-18 DIAGNOSIS — M54.9 BACK PAIN, UNSPECIFIED BACK LOCATION, UNSPECIFIED BACK PAIN LATERALITY, UNSPECIFIED CHRONICITY: ICD-10-CM

## 2025-03-20 RX ORDER — CYCLOBENZAPRINE HCL 10 MG
10 TABLET ORAL 3 TIMES DAILY PRN
Qty: 90 TABLET | Refills: 0 | Status: SHIPPED | OUTPATIENT
Start: 2025-03-20 | End: 2025-04-19

## 2025-03-20 NOTE — TELEPHONE ENCOUNTER
No care due was identified.  [facility] Embedded Care Due Messages. Reference number: [id number].   3/20/2025 8:14:02 AM CDT

## 2025-03-25 DIAGNOSIS — B00.9 HERPES SIMPLEX: ICD-10-CM

## 2025-03-26 RX ORDER — VALACYCLOVIR HYDROCHLORIDE 1 G/1
2 TABLET, FILM COATED ORAL EVERY 12 HOURS
Qty: 12 TABLET | Refills: 5 | Status: SHIPPED | OUTPATIENT
Start: 2025-03-26

## 2025-03-26 NOTE — TELEPHONE ENCOUNTER
Please see the attached refill request.  
Detail Level: Detailed
Kenalog Preparation: kenalog
Concentration (Mg/Ml): 10
Total Volume Injected (Ccs): 0.4
Lot # (Optional): [id number]
Expiration Date (Optional): 4/26
Administered By (Optional): Dr. Kike Helm
Price (Use Numbers Only, No Special Characters Or $): 0.00
Consent: The risks of atrophy were reviewed with the patient.

## 2025-03-27 ENCOUNTER — PATIENT MESSAGE (OUTPATIENT)
Dept: VASCULAR SURGERY | Facility: CLINIC | Age: 45
End: 2025-03-27
Payer: OTHER GOVERNMENT

## 2025-03-28 DIAGNOSIS — I83.811 VARICOSE VEINS OF RIGHT LOWER EXTREMITY WITH PAIN: ICD-10-CM

## 2025-03-28 DIAGNOSIS — I87.2 VENOUS INSUFFICIENCY: Primary | ICD-10-CM

## 2025-04-23 ENCOUNTER — TELEPHONE (OUTPATIENT)
Dept: INTERNAL MEDICINE | Facility: CLINIC | Age: 45
End: 2025-04-23
Payer: OTHER GOVERNMENT

## 2025-04-23 ENCOUNTER — PATIENT MESSAGE (OUTPATIENT)
Dept: INTERNAL MEDICINE | Facility: CLINIC | Age: 45
End: 2025-04-23
Payer: OTHER GOVERNMENT

## 2025-04-23 DIAGNOSIS — I87.2 VENOUS INSUFFICIENCY: Primary | ICD-10-CM

## 2025-04-29 ENCOUNTER — PATIENT MESSAGE (OUTPATIENT)
Dept: INTERNAL MEDICINE | Facility: CLINIC | Age: 45
End: 2025-04-29
Payer: OTHER GOVERNMENT

## 2025-04-29 NOTE — TELEPHONE ENCOUNTER
"Faxed referral to Dr. Cullen Werner - [facility] today, 4/29/25.     [facility]   Dr. Cullen Werner   P: [phone]  F: [phone]    _____________________________________________    Your fax has been successfully sent to [phone] at [phone].  ------------------------------------------------------------  From: [id number]  ------------------------------------------------------------  4/29/2025 2:13:42 PM Transmission Record          Sent to [phone] with remote ID ""          Result: (0/339;0/0) Success          Page record: 1 - 4          Elapsed time: 02:16 on channel 49   "

## 2025-05-02 DIAGNOSIS — K21.9 GASTROESOPHAGEAL REFLUX DISEASE WITHOUT ESOPHAGITIS: ICD-10-CM

## 2025-05-02 RX ORDER — ONDANSETRON 4 MG/1
4 TABLET, ORALLY DISINTEGRATING ORAL EVERY 8 HOURS PRN
Qty: 40 TABLET | Refills: 1 | Status: SHIPPED | OUTPATIENT
Start: 2025-05-02

## 2025-05-02 NOTE — TELEPHONE ENCOUNTER
Refill Encounter    PCP Visits: Recent Visits  Date Type Provider Dept   05/31/24 Office Visit Weeks, Marshall FREY DO [facility] Internal Medicine   05/22/24 Office Visit Weeks, Marshall FREY,  [facility] Internal Medicine   Showing recent visits within past 360 days and meeting all other requirements  Future Appointments  No visits were found meeting these conditions.  Showing future appointments within next 720 days and meeting all other requirements      Last 3 Blood Pressure:   BP Readings from Last 3 Encounters:   01/29/25 127/86   11/22/24 104/70   09/09/24 110/66     Preferred Pharmacy:     [id number] - [place] - [facility] AT [facility] [place]  [facility] [place] [address]  Phone: [phone] Fax: [phone]    Requested RX:  Requested Prescriptions     Pending Prescriptions Disp Refills    ondansetron (ZOFRAN-ODT) 4 MG TbDL 40 tablet 1     Sig: Take 1 tablet (4 mg total) by mouth every 8 (eight) hours as needed.      RX Route: Normal

## 2025-05-02 NOTE — TELEPHONE ENCOUNTER
No care due was identified.  [facility] Embedded Care Due Messages. Reference number: [id number].   5/02/2025 1:46:18 PM CDT

## 2025-05-07 DIAGNOSIS — K21.9 GASTROESOPHAGEAL REFLUX DISEASE WITHOUT ESOPHAGITIS: ICD-10-CM

## 2025-05-07 RX ORDER — ONDANSETRON 4 MG/1
4 TABLET, ORALLY DISINTEGRATING ORAL EVERY 8 HOURS PRN
Qty: 40 TABLET | Refills: 1 | Status: SHIPPED | OUTPATIENT
Start: 2025-05-07

## 2025-05-07 NOTE — TELEPHONE ENCOUNTER
No care due was identified.  [facility] Embedded Care Due Messages. Reference number: [id number].   5/07/2025 1:21:27 PM CDT

## 2025-05-09 ENCOUNTER — HOSPITAL ENCOUNTER (OUTPATIENT)
Dept: RADIOLOGY | Facility: HOSPITAL | Age: 45
Discharge: HOME | End: 2025-05-09
Attending: FAMILY MEDICINE
Payer: OTHER GOVERNMENT

## 2025-05-09 VITALS — WEIGHT: 160 LBS | BODY MASS INDEX: 24.25 KG/M2 | HEIGHT: 68 IN

## 2025-05-09 DIAGNOSIS — Z12.31 ENCOUNTER FOR SCREENING MAMMOGRAM FOR BREAST CANCER: ICD-10-CM

## 2025-05-09 PROCEDURE — 77063 BREAST TOMOSYNTHESIS BI: CPT | Mod: TC

## 2025-05-09 PROCEDURE — 77063 BREAST TOMOSYNTHESIS BI: CPT | Mod: 26,,, | Performed by: RADIOLOGY

## 2025-05-09 PROCEDURE — 77067 SCR MAMMO BI INCL CAD: CPT | Mod: 26,,, | Performed by: RADIOLOGY

## 2025-05-29 ENCOUNTER — TELEPHONE (OUTPATIENT)
Dept: INTERNAL MEDICINE | Facility: CLINIC | Age: 45
End: 2025-05-29
Payer: OTHER GOVERNMENT

## 2025-05-29 ENCOUNTER — PATIENT MESSAGE (OUTPATIENT)
Dept: INTERNAL MEDICINE | Facility: CLINIC | Age: 45
End: 2025-05-29
Payer: OTHER GOVERNMENT

## 2025-05-29 ENCOUNTER — PATIENT MESSAGE (OUTPATIENT)
Dept: OPTOMETRY | Facility: CLINIC | Age: 45
End: 2025-05-29
Payer: OTHER GOVERNMENT

## 2025-06-24 ENCOUNTER — PATIENT MESSAGE (OUTPATIENT)
Dept: INTERNAL MEDICINE | Facility: CLINIC | Age: 45
End: 2025-06-24
Payer: OTHER GOVERNMENT

## 2025-06-24 NOTE — LETTER
Referral  Referral # [id number]    Referral Information    Referral # Creation Date Referral Status Status Update    [id number] 04/23/2025 Authorized 06/13/2025: Status History     Status Reason Referral Type Referral Reasons Referral Class   Received Carrier Authorization Consultation Specialty Services Required Outgoing     To Provider Specialty To Provider To Location/Place of Service To Department   Cardiothoracic Surgery Cullen Werner MD none none     To Vendor Referred By By Location/Place of Service By Department   none Khai Barrientos NP [facility] INTERNAL MEDICINE     Priority Start Date Expiration Date Referral Entered By   Routine 07/27/2024 07/27/2025 Khai Barrientos NP     Visits Requested Visits Authorized Visits Completed Visits Scheduled   1 1       Primary Coverage    Payer Plan Sponsor Code Group Number Group Name   [facility]        Primary Subscriber    Subscriber ID Subscriber Name Subscriber SSN Subscriber Address   [id number] ROBYN SAINZ  [address] [place] [address]     Procedure Information    Service Details  Procedure Modifiers Provider Requested Approved   REF14 - AMB REFERRAL/CONSULT TO CARDIOVASCULAR SURGERY none Cullen Werner MD 1 1     Scheduling    None     Diagnosis Information    Diagnosis   I87.2 (ICD-10-CM) - Venous insufficiency     Referral Notes  Number of Notes: 1  .  Type Date User Summary Attachment   Authorization Information 06/11/2025  3:30 PM Basilia Jones   Approved  [facility]    Authorization #: Auth/order #[id number]  Number of visits approved: 4  Approved codes Precertification not required codes Codes not approved  93012      Diagnoses:    I87.2 (ICD-10-C -   Note:     Approved   Primary Insurance: /[facility]      Authorization #: Auth/order #[id number]  Number of visits approved: 4   Approved codes  Precertification not required codes Codes not approved    01289             Diagnoses:    I87.2 (ICD-10-CM) - Venous insufficiency         Procedures:     REF14 - AMB REFERRAL/CONSULT TO CARDIOVASCULAR SURGERY   Precert required? YES   Is Patient Inpatient or Outpatient?: Outpatient   Call Reference/Case #: Auth/order #[id number]      Information Obtained Via: Web [email or web address]  Insurance Co. and Rep Name (n/a if none):          (PSC) Is this a surgery authorization?: Non-Surgery Authorization      Effective Date: 19   Pre-Existing: NO   Pre-Existing Period: NA   Deductible: 300  Deductible Met: 0  Deductible Remainin   Out of Pocket: 1000  Out of Pocket Met: 101.35  Out of Pocket Remainin.65   Coinsurance:       Copay:           Secondary Insurance:    Authorization #:  Number of visits approved:   Approved codes Precertification not required codes Codes not approved                  Diagnoses:    I87.2 (ICD-10-CM) - Venous insufficiency          Procedures:     REF14 - AMB REFERRAL/CONSULT TO CARDIOVASCULAR SURGERY   Precert required?   Is patient Inpatient or Outpatient?   Call Reference/Case #:   Information Obtained Via:  Phone: (___) ___-___   Voice Response:   Insurance Co. and Rep Name:________  Fax: (___) ___-___  Web Portal:      Effective Date:    Pre-Existing:   Pre-Existing Period:      Deductible:  Deductible Met:  Deductible Remaining:   Out of Pocket:  Out of Pocket Met:  Out of Pocket Remaining:   Coinsurance:      Copay:         Type of Service and admission: Consultation [3]   Location of Service:               DOS:       Notes:

## 2025-07-07 ENCOUNTER — OFFICE VISIT (OUTPATIENT)
Dept: OPTOMETRY | Facility: CLINIC | Age: 45
End: 2025-07-07
Payer: OTHER GOVERNMENT

## 2025-07-07 ENCOUNTER — LAB VISIT (OUTPATIENT)
Dept: LAB | Facility: OTHER | Age: 45
End: 2025-07-07
Attending: FAMILY MEDICINE
Payer: OTHER GOVERNMENT

## 2025-07-07 ENCOUNTER — OFFICE VISIT (OUTPATIENT)
Dept: INTERNAL MEDICINE | Facility: CLINIC | Age: 45
End: 2025-07-07
Payer: OTHER GOVERNMENT

## 2025-07-07 VITALS
HEIGHT: 68 IN | DIASTOLIC BLOOD PRESSURE: 60 MMHG | SYSTOLIC BLOOD PRESSURE: 90 MMHG | WEIGHT: 159.38 LBS | OXYGEN SATURATION: 97 % | BODY MASS INDEX: 24.15 KG/M2 | HEART RATE: 82 BPM

## 2025-07-07 DIAGNOSIS — Z00.00 ROUTINE HEALTH MAINTENANCE: ICD-10-CM

## 2025-07-07 DIAGNOSIS — H04.202 EPIPHORA, LEFT: Primary | ICD-10-CM

## 2025-07-07 DIAGNOSIS — I87.2 VENOUS INSUFFICIENCY: Primary | ICD-10-CM

## 2025-07-07 DIAGNOSIS — R00.0 TACHYCARDIA: ICD-10-CM

## 2025-07-07 DIAGNOSIS — Z12.11 COLON CANCER SCREENING: ICD-10-CM

## 2025-07-07 DIAGNOSIS — Z01.00 ROUTINE EYE EXAM: ICD-10-CM

## 2025-07-07 DIAGNOSIS — R35.0 URINARY FREQUENCY: ICD-10-CM

## 2025-07-07 DIAGNOSIS — K21.9 GASTROESOPHAGEAL REFLUX DISEASE WITHOUT ESOPHAGITIS: ICD-10-CM

## 2025-07-07 LAB
ABSOLUTE EOSINOPHIL (OHS): 0.14 K/UL
ABSOLUTE MONOCYTE (OHS): 0.59 K/UL (ref 0.3–1)
ABSOLUTE NEUTROPHIL COUNT (OHS): 5.67 K/UL (ref 1.8–7.7)
ALBUMIN SERPL BCP-MCNC: 4.5 G/DL (ref 3.5–5.2)
ALP SERPL-CCNC: 37 UNIT/L (ref 40–150)
ALT SERPL W/O P-5'-P-CCNC: 22 UNIT/L (ref 10–44)
ANION GAP (OHS): 13 MMOL/L (ref 8–16)
AST SERPL-CCNC: 18 UNIT/L (ref 11–45)
BASOPHILS # BLD AUTO: 0.03 K/UL
BASOPHILS NFR BLD AUTO: 0.3 %
BILIRUB SERPL-MCNC: 0.3 MG/DL (ref 0.1–1)
BILIRUB UR QL STRIP.AUTO: NEGATIVE
BUN SERPL-MCNC: 11 MG/DL (ref 6–20)
CALCIUM SERPL-MCNC: 10.6 MG/DL (ref 8.7–10.5)
CHLORIDE SERPL-SCNC: 106 MMOL/L (ref 95–110)
CHOLEST SERPL-MCNC: 214 MG/DL (ref 120–199)
CHOLEST/HDLC SERPL: 3.5 {RATIO} (ref 2–5)
CLARITY UR: CLEAR
CO2 SERPL-SCNC: 25 MMOL/L (ref 23–29)
COLOR UR AUTO: YELLOW
CREAT SERPL-MCNC: 0.9 MG/DL (ref 0.5–1.4)
ERYTHROCYTE [DISTWIDTH] IN BLOOD BY AUTOMATED COUNT: 11.9 % (ref 11.5–14.5)
GFR SERPLBLD CREATININE-BSD FMLA CKD-EPI: >60 ML/MIN/1.73/M2
GLUCOSE SERPL-MCNC: 83 MG/DL (ref 70–110)
GLUCOSE UR QL STRIP: NEGATIVE
HCT VFR BLD AUTO: 43.7 % (ref 37–48.5)
HDLC SERPL-MCNC: 62 MG/DL (ref 40–75)
HDLC SERPL: 29 % (ref 20–50)
HGB BLD-MCNC: 14.7 GM/DL (ref 12–16)
HGB UR QL STRIP: NEGATIVE
IMM GRANULOCYTES # BLD AUTO: 0.02 K/UL (ref 0–0.04)
IMM GRANULOCYTES NFR BLD AUTO: 0.2 % (ref 0–0.5)
KETONES UR QL STRIP: NEGATIVE
LDLC SERPL CALC-MCNC: 129.2 MG/DL (ref 63–159)
LEUKOCYTE ESTERASE UR QL STRIP: NEGATIVE
LYMPHOCYTES # BLD AUTO: 2.24 K/UL (ref 1–4.8)
MCH RBC QN AUTO: 31.6 PG (ref 27–31)
MCHC RBC AUTO-ENTMCNC: 33.6 G/DL (ref 32–36)
MCV RBC AUTO: 94 FL (ref 82–98)
NITRITE UR QL STRIP: NEGATIVE
NONHDLC SERPL-MCNC: 152 MG/DL
NUCLEATED RBC (/100WBC) (OHS): 0 /100 WBC
PH UR STRIP: 7 [PH]
PLATELET # BLD AUTO: 168 K/UL (ref 150–450)
PMV BLD AUTO: 12.4 FL (ref 9.2–12.9)
POTASSIUM SERPL-SCNC: 5.1 MMOL/L (ref 3.5–5.1)
PROT SERPL-MCNC: 8.1 GM/DL (ref 6–8.4)
PROT UR QL STRIP: NEGATIVE
RBC # BLD AUTO: 4.65 M/UL (ref 4–5.4)
RELATIVE EOSINOPHIL (OHS): 1.6 %
RELATIVE LYMPHOCYTE (OHS): 25.8 % (ref 18–48)
RELATIVE MONOCYTE (OHS): 6.8 % (ref 4–15)
RELATIVE NEUTROPHIL (OHS): 65.3 % (ref 38–73)
SODIUM SERPL-SCNC: 144 MMOL/L (ref 136–145)
SP GR UR STRIP: 1.01
TRIGL SERPL-MCNC: 114 MG/DL (ref 30–150)
TSH SERPL-ACNC: 1.85 UIU/ML (ref 0.4–4)
UROBILINOGEN UR STRIP-ACNC: NEGATIVE EU/DL
WBC # BLD AUTO: 8.69 K/UL (ref 3.9–12.7)

## 2025-07-07 PROCEDURE — 99215 OFFICE O/P EST HI 40 MIN: CPT | Mod: PBBFAC,27 | Performed by: FAMILY MEDICINE

## 2025-07-07 PROCEDURE — 84443 ASSAY THYROID STIM HORMONE: CPT

## 2025-07-07 PROCEDURE — 99999 PR PBB SHADOW E&M-EST. PATIENT-LVL V: CPT | Mod: PBBFAC,,, | Performed by: FAMILY MEDICINE

## 2025-07-07 PROCEDURE — 99999 PR PBB SHADOW E&M-EST. PATIENT-LVL III: CPT | Mod: PBBFAC,,, | Performed by: OPTOMETRIST

## 2025-07-07 PROCEDURE — 99213 OFFICE O/P EST LOW 20 MIN: CPT | Mod: PBBFAC | Performed by: OPTOMETRIST

## 2025-07-07 PROCEDURE — 81003 URINALYSIS AUTO W/O SCOPE: CPT

## 2025-07-07 PROCEDURE — 80061 LIPID PANEL: CPT

## 2025-07-07 PROCEDURE — 99396 PREV VISIT EST AGE 40-64: CPT | Mod: S$PBB,,, | Performed by: FAMILY MEDICINE

## 2025-07-07 PROCEDURE — 85025 COMPLETE CBC W/AUTO DIFF WBC: CPT

## 2025-07-07 PROCEDURE — 80053 COMPREHEN METABOLIC PANEL: CPT

## 2025-07-07 PROCEDURE — 36415 COLL VENOUS BLD VENIPUNCTURE: CPT

## 2025-07-07 PROCEDURE — 92004 COMPRE OPH EXAM NEW PT 1/>: CPT | Mod: S$PBB,,, | Performed by: OPTOMETRIST

## 2025-07-07 NOTE — PROGRESS NOTES
Subjective:       Patient ID: Alysa Jones is a 44 y.o. female.    Chief Complaint: Annual Exam (Need referral for veins on leg )    HPI  History of Present Illness    CHIEF COMPLAINT:  Alysa presents today for follow up of multiple concerns including eye issues and varicose veins    CHRONIC DRY EYE:  She reports chronic eye watering for six years with recent diagnosis of chronic dry eye in one eye. Vision is 20/20 in one eye and 20/25 in the other. The affected eye is consistently swollen, puffy, and irritated. Symptoms began significantly impacting her vision approximately six months ago. She uses prescribed Systane eye drops for management.    VARICOSE VEINS:  She has history of vein ablation procedure in 2014. The previously treated vein has reopened, which her prior physician did not recommend re-ablating. She obtained a second opinion at a specialized vein institute where an ultrasound was performed.    URINARY SYMPTOMS:  She reports frequent urination, particularly in the evenings, with multiple urinations in short succession after initial void. She denies burning or pain with urination. She expresses concern about potential bladder emptying issues, citing family history of similar symptoms.    MENSTRUAL AND HORMONAL:  She reports amenorrhea for over 200 days. She takes progesterone prescribed by OB/GYN due to history of blood clots, which precludes estrogen treatment. She experiences occasional night sweats and questions whether she is approaching menopause. She expresses concern about potential progesterone side effects, including weight gain.    GERD:  She has long-standing history of GERD with symptoms occurring regardless of eating or drinking habits. Previous endoscopies showed redness but no other significant findings. Last colonoscopy was completed approximately 5 years ago.    CURRENT MEDICATIONS:  She continues Perpanal, Zofran, and hydrocortisone 2% cream for management of her medical  "conditions.      ROS:  General: -fever, -chills, -fatigue, -weight gain, -weight loss, +night sweats  Eyes: -vision changes, -redness, -discharge, +eye watering, +dry eyes  ENT: -ear pain, -nasal congestion, -sore throat  Cardiovascular: -chest pain, -palpitations, -lower extremity edema  Respiratory: -cough, -shortness of breath  Gastrointestinal: -abdominal pain, -nausea, -vomiting, -diarrhea, -constipation, -blood in stool, +indigestion, +heartburn  Genitourinary: -dysuria, -hematuria, -frequency, +excessive urination  Musculoskeletal: -joint pain, -muscle pain  Skin: -rash, -lesion  Neurological: +headache, -dizziness, -numbness, -tingling  Psychiatric: -anxiety, -depression, -sleep difficulty  Head: +head pain  Female Genitourinary: +absent or irregular periods  Lymphatics: +swollen lymph nodes           All of your core healthy metrics are met.      Social History     Social History Narrative    She does not exercise regularly.       Family History   Problem Relation Name Age of Onset    Hyperlipidemia Mother Mom     Hypertension Mother Mom     Hypertension Father Dad     Breast cancer Maternal Aunt Moms sister 40    Cancer Maternal Aunt Moms sister     Diabetes Maternal Grandfather Moms dad     Heart disease Maternal Grandfather Moms dad     Breast cancer Paternal Grandmother Fathers mom 40    Cancer Paternal Grandmother Fathers mom     Hypertension Sister Oldsr sister      Current Medications[1]    Review of Systems    Objective:   BP 90/60 (Patient Position: Sitting)   Pulse 82   Ht 5' 8" (1.727 m)   Wt 72.3 kg (159 lb 6.3 oz)   SpO2 97%   BMI 24.24 kg/m²      Physical Exam  Vitals reviewed.   Constitutional:       Appearance: She is well-developed.   HENT:      Head: Normocephalic and atraumatic.   Eyes:      Conjunctiva/sclera: Conjunctivae normal.   Cardiovascular:      Rate and Rhythm: Normal rate.   Pulmonary:      Effort: Pulmonary effort is normal. No respiratory distress.   Skin:     General: " Skin is warm and dry.      Findings: No rash.   Neurological:      Mental Status: She is alert and oriented to person, place, and time.      Coordination: Coordination normal.   Psychiatric:         Behavior: Behavior normal.         Physical Exam              @resultssec@  Assessment & Plan   Assessment & Plan    IMPRESSION:     Evaluated reopened vein issue and determined need for  possible re-ablation and sclerotherapy.   Assessed frequent urination symptoms and determined need for urological evaluation to rule out incomplete bladder emptying.   Considered amenorrhea (>200 days) in context of current progesterone-only birth control use; discussed potential for discontinuation to assess menopausal status.    PLAN SUMMARY:   Order routine labs and urine test   Order colonoscopy for cancer screening   Refer to Dr. Bennett at [facility] for venous treatment   Order urologist consult for bladder scan and post-void residual measurement      GASTRO-ESOPHAGEAL REFLUX DISEASE (GERD):   Evaluated the patient's lifelong GERD condition, with no findings on EGD except erythema.      VENOUS INSUFFICIENCY:   Assessed the patient's condition following ultrasound and determined possible re-ablation and rounds of sclerotherapy are needed.   Referred the patient to Dr. Bennett at [facility] for venous treatment.   Noted issues with [facility] referral system that need to be addressed.    AMENORRHEA AND MENOPAUSAL SYMPTOMS:   Evaluated the patient who has not had a menstrual cycle in over 200 days and is experiencing menopausal symptoms such as night sweats.   Alysa is currently on progesterone due to a history of clots.   Provided information on menopausal transition and potential symptoms.   Discussed potentially discontinuing progesterone to assess if menopausal symptoms worsen.    URINARY FREQUENCY:   Evaluated the patient who reports frequent urination, especially in the evening, without symptoms of UTI.    Ordered urologist consult for bladder scan and post-void residual measurement to further evaluate this condition.    HISTORY OF VENOUS THROMBOEMBOLISM:   Continued progesterone therapy due to the patient's history of clots.    PREVENTIVE CARE:   Explained rationale for colonoscopy screening starting at age 45.   Ordered colonoscopy for cancer screening.   Ordered routine labs and urine test as part of preventive care.    FOLLOW-UP:   Contact the office if running out of medications (Propranolol, Zofran, hydrocortisone 2%).         Problem List Items Addressed This Visit          Cardiac/Vascular    Venous insufficiency - Primary    Relevant Orders    Ambulatory referral/consult to Vascular Surgery       GI    GERD (gastroesophageal reflux disease)     Other Visit Diagnoses         Colon cancer screening        Relevant Orders    Ambulatory referral/consult to Endo Procedure       Routine health maintenance        Relevant Orders    Lipid Panel    Comprehensive Metabolic Panel (Completed)      Tachycardia        Relevant Orders    CBC Auto Differential (Completed)    TSH (Completed)      Urinary frequency        Relevant Orders    Urinalysis (Completed)    Ambulatory referral/consult to Urology              Immunizations Administered on Date of Encounter - 7/7/2025       No immunizations on file.             No follow-ups on file.    This note was generated with the assistance of ambient listening technology. Verbal consent was obtained by the patient and accompanying visitor(s) for the recording of patient appointment to facilitate this note. I attest to having reviewed and edited the generated note for accuracy, though some syntax or spelling errors may persist. Please contact the author of this note for any clarification.      Disclaimer:  This note may have been prepared using voice recognition software, it may have not been extensively proofed, as such there could be errors within the text such as sound  alike errors.         [1]   Current Outpatient Medications:     butalbital-acetaminophen-caffeine -40 mg (FIORICET) -40 mg per tablet, Take 1 tablet by mouth every 4 (four) hours as needed for Pain., Disp: 90 tablet, Rfl: 0    esomeprazole (NEXIUM) 40 MG capsule, TAKE 1 CAPSULE(40 MG) BY MOUTH BEFORE BREAKFAST, Disp: 90 capsule, Rfl: 2    hydrocortisone 2.5 % ointment, Apply topically 2 (two) times daily as needed., Disp: , Rfl:     mupirocin (BACTROBAN) 2 % ointment, APPLY TOPICALLY TO THE AFFECTED AREA TWICE DAILY, Disp: 30 g, Rfl: 3    norethindrone (MICRONOR) 0.35 mg tablet, Take 1 tablet (0.35 mg total) by mouth once daily., Disp: 90 tablet, Rfl: 3    ondansetron (ZOFRAN-ODT) 4 MG TbDL, Take 1 tablet (4 mg total) by mouth every 8 (eight) hours as needed., Disp: 40 tablet, Rfl: 1    propranoloL (INDERAL) 10 MG tablet, Take 1 tablet (10 mg total) by mouth 2 (two) times daily as needed (anxiety/palpitations)., Disp: 60 tablet, Rfl: 11    sulfacetamide sodium-sulfur (SULFACLEANSE 8-4) 8-4 % Susp, Wash face qhs, Disp: 473 mL, Rfl: 9    valACYclovir (VALTREX) 1000 MG tablet, Take 2 tablets (2,000 mg total) by mouth every 12 (twelve) hours. For 1-2 days at first sign of flare, Disp: 12 tablet, Rfl: 5    Current Facility-Administered Medications:     polidocanoL 1 % (20 mg/2 mL) injection Soln 0.2 mL, 0.2 mL, Intravenous, 1 time in Clinic/HOD, Rohit Mcfadden MD    polidocanoL 1 % (20 mg/2 mL) injection Soln 0.2 mL, 0.2 mL, Intravenous, 1 time in Clinic/HOD, Rohit Mcfadden MD

## 2025-07-07 NOTE — PROGRESS NOTES
HPI    TD: This is patients first eye exam   Chief complaint (CC): patient here for ocular health concerns   Patient having constantly tearing left eye that has not gotten better in   the past 6 years   Patient has scene Primary physician and ENT specialist and has completed   various therapies without any relief.   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   Constants tearing OS that she believes to be allergies  Glasses? + just started wearing readers   Contacts? -  H/o eye surgery, injections or laser: -  H/o eye injury: -  Known eye conditions? -  Family h/o eye conditions? -  Eye gtts? -      (-) Flashes (-)  Floaters (-) Mucous   (+++ Left eye heavily)  Tearing (-) Itching (-) Burning   (-) Headaches (-) Eye Pain/discomfort (-) Irritation   (-)  Redness (-) Double vision (-) Blurry vision    Diabetic? -  A1c? Hemoglobin A1C       Date                     Value               Ref Range             Status                02/23/2023               5.1                 4.0 - 5.6 %           Final              Last edited by Evangelina Cedeno on 7/7/2025  9:29 AM.            Assessment /Plan     For exam results, see Encounter Report.    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

## 2025-07-08 ENCOUNTER — PATIENT MESSAGE (OUTPATIENT)
Dept: INTERNAL MEDICINE | Facility: CLINIC | Age: 45
End: 2025-07-08
Payer: OTHER GOVERNMENT

## 2025-07-15 ENCOUNTER — PATIENT MESSAGE (OUTPATIENT)
Dept: INTERNAL MEDICINE | Facility: CLINIC | Age: 45
End: 2025-07-15
Payer: OTHER GOVERNMENT

## 2025-08-09 RX ORDER — ESOMEPRAZOLE MAGNESIUM 40 MG/1
40 CAPSULE, DELAYED RELEASE ORAL
Qty: 90 CAPSULE | Refills: 3 | Status: SHIPPED | OUTPATIENT
Start: 2025-08-09

## 2025-08-09 NOTE — TELEPHONE ENCOUNTER
Refill Decision Note   Alysa Jones  is requesting a refill authorization.  Brief Assessment and Rationale for Refill:  Approve     Medication Therapy Plan:         Comments:     Note composed:1:59 PM 08/09/2025

## 2025-08-29 DIAGNOSIS — N93.9 ABNORMAL UTERINE BLEEDING: ICD-10-CM

## 2025-08-29 RX ORDER — NORETHINDRONE 0.35 MG/1
1 TABLET ORAL DAILY
Qty: 90 TABLET | Refills: 0 | Status: SHIPPED | OUTPATIENT
Start: 2025-08-29